# Patient Record
Sex: FEMALE | Race: BLACK OR AFRICAN AMERICAN | Employment: PART TIME
[De-identification: names, ages, dates, MRNs, and addresses within clinical notes are randomized per-mention and may not be internally consistent; named-entity substitution may affect disease eponyms.]

---

## 2017-02-08 ENCOUNTER — OFFICE VISIT (OUTPATIENT)
Dept: NEUROLOGY | Facility: CLINIC | Age: 57
End: 2017-02-08

## 2017-02-08 VITALS
BODY MASS INDEX: 34.78 KG/M2 | HEART RATE: 84 BPM | DIASTOLIC BLOOD PRESSURE: 80 MMHG | HEIGHT: 62 IN | SYSTOLIC BLOOD PRESSURE: 128 MMHG | WEIGHT: 189 LBS

## 2017-02-08 DIAGNOSIS — R51.9 HEADACHE DISORDER: Primary | ICD-10-CM

## 2017-02-08 PROCEDURE — 99214 OFFICE O/P EST MOD 30 MIN: CPT | Performed by: PSYCHIATRY & NEUROLOGY

## 2017-02-21 ENCOUNTER — OFFICE VISIT (OUTPATIENT)
Dept: FAMILY MEDICINE CLINIC | Facility: CLINIC | Age: 57
End: 2017-02-21

## 2017-02-21 VITALS
HEART RATE: 69 BPM | DIASTOLIC BLOOD PRESSURE: 59 MMHG | TEMPERATURE: 97.4 F | BODY MASS INDEX: 34.67 KG/M2 | SYSTOLIC BLOOD PRESSURE: 99 MMHG | HEIGHT: 62 IN | WEIGHT: 188.4 LBS | RESPIRATION RATE: 16 BRPM

## 2017-02-21 DIAGNOSIS — G43.909 MIGRAINE WITHOUT STATUS MIGRAINOSUS, NOT INTRACTABLE, UNSPECIFIED MIGRAINE TYPE: Primary | ICD-10-CM

## 2017-02-21 PROCEDURE — 99213 OFFICE O/P EST LOW 20 MIN: CPT | Performed by: INTERNAL MEDICINE

## 2017-02-21 ASSESSMENT — PATIENT HEALTH QUESTIONNAIRE - PHQ9
SUM OF ALL RESPONSES TO PHQ9 QUESTIONS 1 & 2: 0
1. LITTLE INTEREST OR PLEASURE IN DOING THINGS: 0
2. FEELING DOWN, DEPRESSED OR HOPELESS: 0
SUM OF ALL RESPONSES TO PHQ QUESTIONS 1-9: 0

## 2017-04-04 ENCOUNTER — OFFICE VISIT (OUTPATIENT)
Dept: NEUROLOGY | Age: 57
End: 2017-04-04
Payer: MEDICARE

## 2017-04-04 VITALS
HEIGHT: 63 IN | DIASTOLIC BLOOD PRESSURE: 82 MMHG | WEIGHT: 187 LBS | HEART RATE: 77 BPM | SYSTOLIC BLOOD PRESSURE: 117 MMHG | BODY MASS INDEX: 33.13 KG/M2

## 2017-04-04 DIAGNOSIS — G43.711 CHRONIC MIGRAINE WITHOUT AURA, WITH INTRACTABLE MIGRAINE, SO STATED, WITH STATUS MIGRAINOSUS: Primary | ICD-10-CM

## 2017-04-04 PROCEDURE — 99214 OFFICE O/P EST MOD 30 MIN: CPT | Performed by: PSYCHIATRY & NEUROLOGY

## 2017-04-04 RX ORDER — AMITRIPTYLINE HYDROCHLORIDE 25 MG/1
25 TABLET, FILM COATED ORAL NIGHTLY
Qty: 30 TABLET | Refills: 3 | Status: SHIPPED | OUTPATIENT
Start: 2017-04-04 | End: 2017-10-19 | Stop reason: SDUPTHER

## 2017-06-30 RX ORDER — TOPIRAMATE 25 MG/1
25 TABLET ORAL 2 TIMES DAILY
Qty: 60 TABLET | Refills: 1 | Status: SHIPPED | OUTPATIENT
Start: 2017-06-30 | End: 2017-10-19 | Stop reason: SDUPTHER

## 2017-08-17 ENCOUNTER — OFFICE VISIT (OUTPATIENT)
Dept: FAMILY MEDICINE CLINIC | Age: 57
End: 2017-08-17
Payer: MEDICARE

## 2017-08-17 VITALS
HEIGHT: 63 IN | TEMPERATURE: 98 F | RESPIRATION RATE: 16 BRPM | DIASTOLIC BLOOD PRESSURE: 78 MMHG | SYSTOLIC BLOOD PRESSURE: 122 MMHG | BODY MASS INDEX: 33.27 KG/M2 | WEIGHT: 187.74 LBS | HEART RATE: 84 BPM

## 2017-08-17 DIAGNOSIS — Z12.39 SCREENING BREAST EXAMINATION: ICD-10-CM

## 2017-08-17 DIAGNOSIS — M79.641 RIGHT HAND PAIN: Primary | ICD-10-CM

## 2017-08-17 PROCEDURE — 99213 OFFICE O/P EST LOW 20 MIN: CPT | Performed by: INTERNAL MEDICINE

## 2017-08-17 RX ORDER — IBUPROFEN 800 MG/1
TABLET ORAL
Refills: 0 | COMMUNITY
Start: 2017-08-09 | End: 2017-08-17

## 2017-08-17 RX ORDER — IBUPROFEN 800 MG/1
800 TABLET ORAL EVERY 8 HOURS PRN
Qty: 90 TABLET | Refills: 0 | Status: SHIPPED | OUTPATIENT
Start: 2017-08-17 | End: 2018-09-10 | Stop reason: ALTCHOICE

## 2017-08-17 RX ORDER — OXYCODONE HYDROCHLORIDE AND ACETAMINOPHEN 5; 325 MG/1; MG/1
TABLET ORAL
Refills: 0 | COMMUNITY
Start: 2017-08-13 | End: 2018-01-09 | Stop reason: ALTCHOICE

## 2017-08-17 RX ORDER — CYCLOBENZAPRINE HCL 10 MG
TABLET ORAL
Refills: 0 | COMMUNITY
Start: 2017-08-09 | End: 2018-01-09 | Stop reason: ALTCHOICE

## 2017-08-31 DIAGNOSIS — M79.641 RIGHT HAND PAIN: ICD-10-CM

## 2017-09-01 ENCOUNTER — TELEPHONE (OUTPATIENT)
Dept: FAMILY MEDICINE CLINIC | Age: 57
End: 2017-09-01

## 2017-10-19 ENCOUNTER — TELEPHONE (OUTPATIENT)
Dept: NEUROLOGY | Age: 57
End: 2017-10-19

## 2017-10-19 NOTE — TELEPHONE ENCOUNTER
I spoke with Rena Allen, she did not go to the ED.  I have put the prescriptions in for your approval.

## 2017-10-20 RX ORDER — AMITRIPTYLINE HYDROCHLORIDE 25 MG/1
25 TABLET, FILM COATED ORAL NIGHTLY
Qty: 30 TABLET | Refills: 0 | Status: SHIPPED | OUTPATIENT
Start: 2017-10-20 | End: 2017-11-21 | Stop reason: SDUPTHER

## 2017-10-20 RX ORDER — TOPIRAMATE 25 MG/1
TABLET ORAL
Qty: 60 TABLET | Refills: 0 | Status: SHIPPED | OUTPATIENT
Start: 2017-10-20 | End: 2017-11-21 | Stop reason: SDUPTHER

## 2017-11-21 RX ORDER — AMITRIPTYLINE HYDROCHLORIDE 25 MG/1
25 TABLET, FILM COATED ORAL NIGHTLY
Qty: 30 TABLET | Refills: 1 | Status: SHIPPED | OUTPATIENT
Start: 2017-11-21 | End: 2018-01-09 | Stop reason: SDUPTHER

## 2017-11-21 RX ORDER — TOPIRAMATE 25 MG/1
25 TABLET ORAL DAILY
Qty: 30 TABLET | Refills: 1 | Status: SHIPPED | OUTPATIENT
Start: 2017-11-21 | End: 2018-01-09 | Stop reason: SDUPTHER

## 2017-11-21 NOTE — TELEPHONE ENCOUNTER
Pt showed up late for her scheduled appt today. Douglas Castañeda was unable to see her. Her appt was rescheduled for 1/9/18. She asked about her refills. I looked and saw that in October Zaina had refill her Elavil and Topamax for 1 month only until her appt today because she had missed her last couple of appts. Pt stated she needed refills. This was discussed with Douglas Castañeda. She stated she would refill them for the pt until her next appt but she needs to show up for it on time. This was expressed to the pt. She stated her understanding.

## 2018-01-09 ENCOUNTER — OFFICE VISIT (OUTPATIENT)
Dept: NEUROLOGY | Age: 58
End: 2018-01-09
Payer: MEDICARE

## 2018-01-09 VITALS
HEIGHT: 63 IN | DIASTOLIC BLOOD PRESSURE: 75 MMHG | SYSTOLIC BLOOD PRESSURE: 108 MMHG | BODY MASS INDEX: 33.31 KG/M2 | HEART RATE: 86 BPM | WEIGHT: 188 LBS

## 2018-01-09 DIAGNOSIS — G43.909 MIGRAINE WITHOUT STATUS MIGRAINOSUS, NOT INTRACTABLE, UNSPECIFIED MIGRAINE TYPE: Primary | ICD-10-CM

## 2018-01-09 PROCEDURE — 99214 OFFICE O/P EST MOD 30 MIN: CPT | Performed by: NURSE PRACTITIONER

## 2018-01-09 PROCEDURE — 3017F COLORECTAL CA SCREEN DOC REV: CPT | Performed by: NURSE PRACTITIONER

## 2018-01-09 PROCEDURE — G8484 FLU IMMUNIZE NO ADMIN: HCPCS | Performed by: NURSE PRACTITIONER

## 2018-01-09 PROCEDURE — 3014F SCREEN MAMMO DOC REV: CPT | Performed by: NURSE PRACTITIONER

## 2018-01-09 PROCEDURE — 1036F TOBACCO NON-USER: CPT | Performed by: NURSE PRACTITIONER

## 2018-01-09 PROCEDURE — G8417 CALC BMI ABV UP PARAM F/U: HCPCS | Performed by: NURSE PRACTITIONER

## 2018-01-09 PROCEDURE — G8427 DOCREV CUR MEDS BY ELIG CLIN: HCPCS | Performed by: NURSE PRACTITIONER

## 2018-01-09 RX ORDER — AMITRIPTYLINE HYDROCHLORIDE 25 MG/1
25 TABLET, FILM COATED ORAL NIGHTLY
Qty: 30 TABLET | Refills: 5 | Status: SHIPPED | OUTPATIENT
Start: 2018-01-09 | End: 2018-09-10 | Stop reason: SDUPTHER

## 2018-01-09 RX ORDER — PREDNISONE 1 MG/1
5 TABLET ORAL DAILY
Refills: 0 | COMMUNITY
Start: 2018-01-03 | End: 2018-09-10 | Stop reason: ALTCHOICE

## 2018-01-09 RX ORDER — TOPIRAMATE 25 MG/1
25 TABLET ORAL DAILY
Qty: 30 TABLET | Refills: 5 | Status: SHIPPED | OUTPATIENT
Start: 2018-01-09 | End: 2018-09-10 | Stop reason: SDUPTHER

## 2018-01-09 NOTE — PROGRESS NOTES
arthritis) ([facility]          Past Surgical History:   Procedure Laterality Date     SECTION      CHOLECYSTECTOMY      TONSILLECTOMY         Family History   Problem Relation Age of Onset    High Blood Pressure Mother     Cancer Maternal Aunt      lung     Cancer Maternal Uncle      prostrate       Social History   Substance Use Topics    Smoking status: Never Smoker    Smokeless tobacco: Never Used    Alcohol use No                               REVIEW OF SYSTEMS    CONSTITUTIONAL Weight: absent, Appetite: absent, Fatigue: present      HEENT Ears: normal, Visual disturbance: absent   RESPIRATORY Shortness of breath: absent, Cough: absent   CARDIOVASCULAR Chest pain: absent, Leg swelling :absent      GI Constipation: absent, Diarrhea: absent, Swallowing change: absent       Urinary frequency: present, Urinary urgency: present, Urinary incontinence: absent   MUSCULOSKELETAL Neck pain: absent, Back pain: absent, Stiffness: absent, Muscle pain: absent, Joint pain: absent Restless legs: absent   DERMATOLOGIC Hair loss: absent, Skin changes: absent   NEUROLOGIC Memory loss: absent, Confusion: absent, Seizures: absent Trouble walking or imbalance: absent, Dizziness: absent, Weakness: absent, Numbness: absent Tremor: absent, Spasm: absent, Speech difficulty: absent, Headache: absent, Light sensitivity: absent   PSYCHIATRIC Anxiety: absent, Hallucination: absent, Mood disorder: absent   HEMATOLOGIC Abnormal bleeding: absent, Anemia: absent, Clotting disorder: absent, Lymph gland changes: absent           Allergies   Allergen Reactions    Sulfa Antibiotics Shortness Of Breath    Aspirin      Not able to take do to G6PD           Current Outpatient Prescriptions   Medication Sig Dispense Refill    predniSONE (DELTASONE) 5 MG tablet Take 5 mg by mouth daily  0    amitriptyline (ELAVIL) 25 MG tablet Take 1 tablet by mouth nightly 30 tablet 1    topiramate (TOPAMAX) 25 MG tablet Take 1 tablet by mouth daily

## 2018-09-10 ENCOUNTER — OFFICE VISIT (OUTPATIENT)
Dept: NEUROLOGY | Age: 58
End: 2018-09-10
Payer: MEDICARE

## 2018-09-10 VITALS
HEIGHT: 62 IN | DIASTOLIC BLOOD PRESSURE: 66 MMHG | BODY MASS INDEX: 34.45 KG/M2 | WEIGHT: 187.2 LBS | SYSTOLIC BLOOD PRESSURE: 121 MMHG | HEART RATE: 64 BPM

## 2018-09-10 DIAGNOSIS — G43.909 MIGRAINE WITHOUT STATUS MIGRAINOSUS, NOT INTRACTABLE, UNSPECIFIED MIGRAINE TYPE: Primary | ICD-10-CM

## 2018-09-10 PROCEDURE — 99214 OFFICE O/P EST MOD 30 MIN: CPT | Performed by: NURSE PRACTITIONER

## 2018-09-10 PROCEDURE — G8417 CALC BMI ABV UP PARAM F/U: HCPCS | Performed by: NURSE PRACTITIONER

## 2018-09-10 PROCEDURE — G8427 DOCREV CUR MEDS BY ELIG CLIN: HCPCS | Performed by: NURSE PRACTITIONER

## 2018-09-10 PROCEDURE — 3017F COLORECTAL CA SCREEN DOC REV: CPT | Performed by: NURSE PRACTITIONER

## 2018-09-10 PROCEDURE — 1036F TOBACCO NON-USER: CPT | Performed by: NURSE PRACTITIONER

## 2018-09-10 RX ORDER — AMITRIPTYLINE HYDROCHLORIDE 50 MG/1
50 TABLET, FILM COATED ORAL NIGHTLY
Qty: 30 TABLET | Refills: 5 | Status: SHIPPED | OUTPATIENT
Start: 2018-09-10

## 2018-09-10 RX ORDER — TOPIRAMATE 25 MG/1
25 TABLET ORAL DAILY
Qty: 30 TABLET | Refills: 5 | Status: SHIPPED | OUTPATIENT
Start: 2018-09-10

## 2018-09-10 NOTE — PROGRESS NOTES
her right eye as well, but denies any blurred or double vision. She denies weakness numbness or tingling in her extremities, gait or balance difficulties.                   Past Medical History:   Diagnosis Date    Anemia     Fibromyalgia     Headache     Hypothyroidism     Osteoarthritis     RA (rheumatoid arthritis) (HCC)     UTI (urinary tract infection)          Past Surgical History:   Procedure Laterality Date     SECTION      CHOLECYSTECTOMY      TONSILLECTOMY         Family History   Problem Relation Age of Onset    High Blood Pressure Mother     Cancer Maternal Aunt         lung     Cancer Maternal Uncle         prostrate       Social History   Substance Use Topics    Smoking status: Never Smoker    Smokeless tobacco: Never Used    Alcohol use No                               REVIEW OF SYSTEMS    CONSTITUTIONAL Weight: absent, Appetite: absent, Fatigue: absent      HEENT Ears: normal, Visual disturbance: absent   RESPIRATORY Shortness of breath: absent, Cough: absent   CARDIOVASCULAR Chest pain: absent, Leg swelling :absent      GI Constipation: absent, Diarrhea: absent, Swallowing change: absent       Urinary frequency: absent, Urinary urgency: absent, Urinary incontinence: absent   MUSCULOSKELETAL Neck pain: absent, Back pain: absent, Stiffness: absent, Muscle pain: absent, Joint pain: absent Restless legs: absent   DERMATOLOGIC Hair loss: absent, Skin changes: absent   NEUROLOGIC Memory loss: absent, Confusion: absent, Seizures: absent Trouble walking or imbalance: absent, Dizziness: absent, Weakness: absent, Numbness: absent Tremor: absent, Spasm: absent, Speech difficulty: absent, Headache: absent, Light sensitivity: absent   PSYCHIATRIC Anxiety: absent, Hallucination: absent, Mood disorder: absent   HEMATOLOGIC Abnormal bleeding: absent, Anemia: absent, Clotting disorder: absent, Lymph gland changes: absent           Allergies   Allergen Reactions    Sulfa Antibiotics expression full, symmetric. CN VIII   CN VIII normal.     CN IX, X   CN IX normal.     CN XI   CN XI normal.     CN XII   CN XII normal.     Motor Exam   Muscle bulk: normal  Overall muscle tone: normal  Right arm tone: normal  Left arm tone: normal  Right arm pronator drift: absent  Left arm pronator drift: absent  Right leg tone: normal  Left leg tone: normal    Strength   Strength 5/5 throughout. Sensory Exam   Light touch normal.   Vibration normal.   Pinprick normal.     Gait, Coordination, and Reflexes     Gait  Gait: normal    Coordination   Finger to nose coordination: normal    Tremor   Resting tremor: absent    Reflexes   Right brachioradialis: 2+  Left brachioradialis: 2+  Right biceps: 2+  Left biceps: 2+  Right triceps: 2+  Left triceps: 2+  Right patellar: 2+  Left patellar: 2+  Right achilles: 2+  Left achilles: 2+  Right plantar: normal  Left plantar: normal            ASSESSMENT/PLAN:       In summary, your patient, Marlena Albarran exhibits the following, with associated plan:    1. Chronic migraine headaches  1. Increase amitriptyline to 50 mg at bedtime daily  2. Continue Topamax 25 mg daily  2. Possible temporal arteritis with chronically elevated sed rates  1. Obtain records from Dr. Carlos Costello from rheumatology. Patient was advised to follow-up with him.             Signed: Claudene Penna, CNP      *Please note that portions of this note were completed with a voice recognition program.  Although every effort was made to insure the accuracy of this automated transcription, some errors in transcription may have occurred, occasionally words and are mis-transcribed

## 2021-03-31 ENCOUNTER — HOSPITAL ENCOUNTER (OUTPATIENT)
Age: 61
Setting detail: SPECIMEN
Discharge: HOME | End: 2021-03-31
Payer: MEDICAID

## 2021-03-31 ENCOUNTER — OFFICE VISIT (OUTPATIENT)
Dept: OBGYN CLINIC | Age: 61
End: 2021-03-31
Payer: MEDICAID

## 2021-03-31 DIAGNOSIS — N95.0 POSTMENOPAUSAL BLEEDING: ICD-10-CM

## 2021-03-31 DIAGNOSIS — Z01.419 WOMEN'S ANNUAL ROUTINE GYNECOLOGICAL EXAMINATION: Primary | ICD-10-CM

## 2021-03-31 DIAGNOSIS — N84.2 VAGINAL POLYP: ICD-10-CM

## 2021-03-31 DIAGNOSIS — R10.30 LOWER ABDOMINAL PAIN: ICD-10-CM

## 2021-03-31 DIAGNOSIS — Z12.4 CERVICAL CANCER SCREENING: ICD-10-CM

## 2021-03-31 DIAGNOSIS — Z20.2 POSSIBLE EXPOSURE TO STD: ICD-10-CM

## 2021-03-31 PROCEDURE — G8484 FLU IMMUNIZE NO ADMIN: HCPCS | Performed by: ADVANCED PRACTICE MIDWIFE

## 2021-03-31 PROCEDURE — 99386 PREV VISIT NEW AGE 40-64: CPT | Performed by: ADVANCED PRACTICE MIDWIFE

## 2021-03-31 SDOH — SOCIAL STABILITY: SOCIAL NETWORK: ARE YOU MARRIED, WIDOWED, DIVORCED, SEPARATED, NEVER MARRIED, OR LIVING WITH A PARTNER?: NOT ASKED

## 2021-03-31 SDOH — ECONOMIC STABILITY: FOOD INSECURITY: WITHIN THE PAST 12 MONTHS, THE FOOD YOU BOUGHT JUST DIDN'T LAST AND YOU DIDN'T HAVE MONEY TO GET MORE.: NEVER TRUE

## 2021-03-31 SDOH — ECONOMIC STABILITY: TRANSPORTATION INSECURITY
IN THE PAST 12 MONTHS, HAS THE LACK OF TRANSPORTATION KEPT YOU FROM MEDICAL APPOINTMENTS OR FROM GETTING MEDICATIONS?: NO

## 2021-03-31 SDOH — SOCIAL STABILITY: SOCIAL NETWORK: HOW OFTEN DO YOU GET TOGETHER WITH FRIENDS OR RELATIVES?: NOT ASKED

## 2021-03-31 SDOH — SOCIAL STABILITY: SOCIAL NETWORK
DO YOU BELONG TO ANY CLUBS OR ORGANIZATIONS SUCH AS CHURCH GROUPS UNIONS, FRATERNAL OR ATHLETIC GROUPS, OR SCHOOL GROUPS?: NOT ASKED

## 2021-03-31 SDOH — SOCIAL STABILITY: SOCIAL INSECURITY: WITHIN THE LAST YEAR, HAVE YOU BEEN AFRAID OF YOUR PARTNER OR EX-PARTNER?: NO

## 2021-03-31 SDOH — SOCIAL STABILITY: SOCIAL INSECURITY: WITHIN THE LAST YEAR, HAVE YOU BEEN HUMILIATED OR EMOTIONALLY ABUSED IN OTHER WAYS BY YOUR PARTNER OR EX-PARTNER?: NO

## 2021-03-31 SDOH — ECONOMIC STABILITY: FOOD INSECURITY: WITHIN THE PAST 12 MONTHS, YOU WORRIED THAT YOUR FOOD WOULD RUN OUT BEFORE YOU GOT MONEY TO BUY MORE.: NEVER TRUE

## 2021-03-31 ASSESSMENT — PATIENT HEALTH QUESTIONNAIRE - PHQ9
SUM OF ALL RESPONSES TO PHQ QUESTIONS 1-9: 0
2. FEELING DOWN, DEPRESSED OR HOPELESS: 0
SUM OF ALL RESPONSES TO PHQ9 QUESTIONS 1 & 2: 0
SUM OF ALL RESPONSES TO PHQ QUESTIONS 1-9: 0
1. LITTLE INTEREST OR PLEASURE IN DOING THINGS: 0

## 2021-03-31 NOTE — PROGRESS NOTES
notes they plan to hold off on endoscopic procedure but are considering repeat flexible sigmoidoscopy vs repeat colonoscopy (would have been due 2024). She reports bloating almost every day. Dr. Ankita Isaacs ordered a CT scan of the abdomen and pelvis for further evaluation which looks to have been done 3/22/21 but no results are available, Jesus Howard reports they scheduled an MRI that was just approved by her insurance. She has a follow up appointment with her PCP on 4/22/21. She completes routine mammograms via [facility] and desires to continue that method so she declines an order today. Review of Systems   Constitutional: Negative. HENT: Negative. Eyes: Negative. Respiratory: Negative. Cardiovascular: Negative. Gastrointestinal: Positive for abdominal pain and constipation (chronic). Negative for anal bleeding, diarrhea, nausea, rectal pain and vomiting. Bloating   Endocrine: Negative. Genitourinary: Positive for pelvic pain and vaginal bleeding (postmenopausal ). Negative for dyspareunia, dysuria, flank pain, frequency, genital sores and vaginal discharge. Musculoskeletal: Negative. Skin: Negative. Allergic/Immunologic: Negative. Neurological: Negative. Hematological: Negative. Psychiatric/Behavioral: Negative.       Preventive Health Screening:   Date of last pap: 2020 with [address], normal per pt report, ROR completed  History of abnormal pap: denies          HPV typing/date: collected today  Date of last mammogram: BI-RADS 1 2020 per pt via [facility]  Date of last DEXA scan: normal 2016  Date of last colonoscopy:  Normal 2012    History of Gestational Diabetes: No     If Yes, Glucose screening ordered: N/A    Preventive screening: Yes, with PCP    Family history of Breast, Ovarian, Colon or Uterine Cancer:  Negative screening     If Yes see scanned worksheet    PHQ Scores 3/31/2021 2/21/2017 11/17/2016 8/11/2016 7/7/2016   PHQ2 Score 0 0 0 0 0   PHQ9 Score 0 0 0 0 0 Interpretation of Total Score Depression Severity: 1-4 = Minimal depression, 5-9 = Mild depression, 10-14 = Moderate depression, 15-19 = Moderately severe depression, 20-27 = Severe depression  No flowsheet data found. Interpretation of MIKAYLA-7 score: 5-9 = mild anxiety, 10-14 = moderate anxiety, 15+ = severe anxiety. Recommend referral to behavioral health for scores 10 or greater. Social Determinants of Health:   Social History     Tobacco Use   Smoking Status Never Smoker   Smokeless Tobacco Never Used      Social History     Substance and Sexual Activity   Alcohol Use No      Social History     Substance and Sexual Activity   Drug Use No      Social Needs    Financial resource strain: Not hard at all      Social Needs   Food insecurity    Worry: Never true    Inability: Never true      Social Needs   Transportation needs    Medical: No    Non-medical: No      Relationships   Social connections    Talks on phone: Not on file    Gets together: Not on file    Attends Anabaptism service: Not on file    Active member of club or organization: Not on file    Attends meetings of clubs or organizations: Not on file    Relationship status: Not on file      Lifestyle    Stress: Not on file      Relationships   Intimate partner violence    Fear of current or ex partner: No    Emotionally abused: No    Physically abused: No    Forced sexual activity: No     Lifestyle   Physical activity    Days per week: Not on file    Minutes per session: Not on file         Objective  Blood pressure 132/84, pulse 102, height 5' 2.4\" (1.585 m), weight 198 lb (89.8 kg), not currently breastfeeding. No LMP recorded. Patient is postmenopausal. Body mass index is 35.75 kg/m².    Current Outpatient Medications on File Prior to Visit   Medication Sig Dispense Refill    amitriptyline (ELAVIL) 50 MG tablet Take 1 tablet by mouth nightly (Patient not taking: Reported on 3/31/2021) 30 tablet 5    topiramate (TOPAMAX) 25 MG Left: No tenderness. Comments: No blood noted in vaginal vault. Bimanual exhibits pressure for patient but no pain. Difficult to palpate adnexae d/t body habitus   Lymphadenopathy:      Cervical: No cervical adenopathy. Upper Body:      Right upper body: No supraclavicular or axillary adenopathy. Left upper body: No supraclavicular or axillary adenopathy. Lower Body: No right inguinal adenopathy. No left inguinal adenopathy. Skin:     General: Skin is warm and dry. Capillary Refill: Capillary refill takes less than 2 seconds. Neurological:      Mental Status: She is alert and oriented to person, place, and time. Mental status is at baseline. Psychiatric:         Mood and Affect: Mood normal.         Behavior: Behavior normal.         Thought Content: Thought content normal.         Judgment: Judgment normal.       Chaperone for Intimate Exam   Chaperone: Pollo HOWELL   Assessment/Plan:  Women's annual routine gynecological examination  General Health:  [x] Alcohol screening & counseling-negative  [x] Blood pressure screening: normal  [] Contraceptive counseling & methods: N/A  [x] Depression Screening: Negative  [x] Diabetes Screening: care per PCP  [] Folic acid supplementation  [] Healthful diet & activity counseling:  Not done  [x] Interpersonal violence screening: Negative  [] Lipid screening: care per PCP  [x] Obesity Screening: Body mass index is 35.75 kg/m².   [x] Osteoporosis screening due 75 yo  [x] Substance use screening & counseling- negative   [x] Tobacco screening & counseling- negative  [x] Urinary incontinence screening- not done    Infectious Diseases:  [x] Gonorrhea & chlamydia screening: ordered  [] Hepatitis C Screening not done  [] HIV risk assessment/ testing (at least once in lifetime): not done   [] Immunizations: not discussed  [x] STI prevention counseling: done    Cancer:  [x] Cervical cancer screening: done - will review records from [facility] when obtained  [x] Mammograms (started at 41yrs old): Declines order, plans to complete via [facility]   [x] BRCA testing risk assessment: not candidate  [x] Colon cancer screening: seeing GI, next routine due 2024  [] Skin Cancer Screening: not done     Cervical cancer screening  · PAP SMEAR; Future  · Do not have records from [facility] for review at this time, ROR filled out and will follow up    Postmenopausal bleeding, vaginal polyp, history of uterine fibroid   · US PELVIS COMPLETE; Future  · US NON OB TRANSVAGINAL; Future  · See Subjective. Postmenopausal x 5 years. No history of HRT. One other episode of postmenopausal bleeding in 2017 and ultrasound showed 8 cm fibroid in the endometrium, they encouraged her to follow up with further vaginal bleeding per notes. Did not want to pursue fibroid removal at that time. · No bleeding noted on exam today, did note two polyps adjacent to the cervix. They are flesh colored and not friable, not sure if this is the cause of her bleeding. Reviewed most polyps are benign. Uterine palpates enlarged, suspect fibroid persists. Pressure on exam but no pain. · Plan for repeat ultrasound and will discuss with collaborating physician following results to discuss plan of care. Lower abdominal pain  · Is being worked up by GI and her PCP (See Subjective) for abdominal pain particularly after defecation and she has abdominal bloating. Per pt she has an upcoming MRI of her abdomen and pelvis, will follow up with her to see if there are pertinent findings. Reports insurance has approved it she just needs scheduled. Possible exposure to STD  · Chlamydia/GC DNA, Thin Prep; Future  · VAGINITIS DNA PROBE; Future  · Reviewed importance of condom usage with every sexual encounter         Return for will follow up after ultrasound results to plan next office visit . Problem list reviewed and updated as indicated.    Upon completion of the visit all questions were answered. History was reviewed as documented on Epic Navigator. The patient, João Lima,  was seen with a total time spent of 35 minutes for the visit on this date of service by the [facility]  The time component involved both face-to-face (counseling and education) and non face-to-face time (care coordination), spent in determining the total time component.       Electronically Signed by: ROBERTO Nation CNM

## 2021-04-01 DIAGNOSIS — Z20.2 POSSIBLE EXPOSURE TO STD: ICD-10-CM

## 2021-04-01 LAB
DIRECT EXAM: NORMAL
Lab: NORMAL
SPECIMEN DESCRIPTION: NORMAL

## 2021-04-02 VITALS
HEART RATE: 102 BPM | WEIGHT: 198 LBS | BODY MASS INDEX: 36.44 KG/M2 | DIASTOLIC BLOOD PRESSURE: 84 MMHG | HEIGHT: 62 IN | SYSTOLIC BLOOD PRESSURE: 132 MMHG

## 2021-04-02 PROBLEM — D75.A G6PD DEFICIENCY: Status: ACTIVE | Noted: 2021-04-02

## 2021-04-02 LAB
CHLAMYDIA BY THIN PREP: NEGATIVE
HPV SAMPLE: NORMAL
HPV, GENOTYPE 16: NOT DETECTED
HPV, GENOTYPE 18: NOT DETECTED
HPV, HIGH RISK OTHER: NOT DETECTED
HPV, INTERPRETATION: NORMAL
N. GONORRHOEAE DNA, THIN PREP: NEGATIVE
SPECIMEN DESCRIPTION: NORMAL
SPECIMEN DESCRIPTION: NORMAL

## 2021-04-02 ASSESSMENT — ENCOUNTER SYMPTOMS
ALLERGIC/IMMUNOLOGIC NEGATIVE: 1
CONSTIPATION: 1
DIARRHEA: 0
VOMITING: 0
ANAL BLEEDING: 0
NAUSEA: 0
EYES NEGATIVE: 1
RECTAL PAIN: 0
RESPIRATORY NEGATIVE: 1
ABDOMINAL PAIN: 1

## 2021-04-06 ENCOUNTER — HOSPITAL ENCOUNTER (OUTPATIENT)
Dept: ULTRASOUND IMAGING | Facility: CLINIC | Age: 61
Discharge: HOME | End: 2021-04-08
Payer: MEDICAID

## 2021-04-06 DIAGNOSIS — N95.0 POSTMENOPAUSAL BLEEDING: ICD-10-CM

## 2021-04-06 LAB — CYTOLOGY REPORT: NORMAL

## 2021-04-06 PROCEDURE — 76856 US EXAM PELVIC COMPLETE: CPT

## 2021-04-07 DIAGNOSIS — N95.0 POSTMENOPAUSAL BLEEDING: Primary | ICD-10-CM

## 2021-04-08 ENCOUNTER — HOSPITAL ENCOUNTER (OUTPATIENT)
Dept: ULTRASOUND IMAGING | Facility: CLINIC | Age: 61
Discharge: HOME | End: 2021-04-10
Payer: MEDICAID

## 2021-04-08 DIAGNOSIS — N95.0 POSTMENOPAUSAL BLEEDING: ICD-10-CM

## 2021-04-08 PROCEDURE — 76830 TRANSVAGINAL US NON-OB: CPT

## 2021-04-09 ENCOUNTER — TELEPHONE (OUTPATIENT)
Dept: OBGYN CLINIC | Age: 61
End: 2021-04-09

## 2021-04-09 NOTE — TELEPHONE ENCOUNTER
Called Steffany to review ultrasound findings. She reports since she was seen in the office 3/31/21 she has had \"a little\" vaginal bleeding. Reports it is dark brown and scant in amount, noted after she wipes and she sees \"tiny clots\" in the toilet after she goes to the bathroom. She has been wearing a liner but does not notice any vaginal bleeding on the liner. Continues to have pain before defecation for which she has been seeing GI, next steps was for an MRI per the pt. Reviewed the ultrasound shows a normal endometrial stripe (3mm), normal size uterus (8.1 x 5.3 x 5.1 cm) with heterogenous echogenicity. She does have a fibroid to the right uterine body/fundal that is 3.6 x 3.3 x 3.7 cm. Was noted to be 8 cm on ultrasound in 2017 per previous provider notes. Reviewed with Uday Gannon that I will review her case with my collaborating physicians (Dr. Alfreda Rodriguez and Dr. Lula Ramos) on Monday and call her back with next steps.

## 2021-04-12 NOTE — TELEPHONE ENCOUNTER
Reviewed patient case with Dr. Rikki Ashton, called Jorden Wick to discuss plan of care. VM left requesting pt to call office and ask for writer.

## 2021-04-14 PROBLEM — D25.9 UTERINE FIBROID: Status: ACTIVE | Noted: 2021-04-14

## 2021-04-14 PROBLEM — N84.2 VAGINAL POLYP: Status: ACTIVE | Noted: 2021-04-14

## 2021-04-14 NOTE — TELEPHONE ENCOUNTER
Reviewed with Ras Del Cid that I spoke with Dr. Diana Whalen about her case and no further testing needs done at this time. Her endometrial lining is thin (see U/S details below). Reviewed may be related to fibroid vs some \"atrophy\" vs the polyps found on exam. She will follow up if vaginal bleeding persists or she has any concerns. Abdominal pain/bloating will continue to be worked up per GI.    Verbalizes understanding

## 2022-08-17 NOTE — PROGRESS NOTES
[facility]  [place] [address]    [address]  [address] [facility] [address]  Dept: [phone]   Patient Name: Richelle Kayser  Patient : 1960  MRN #: [id number]  [facility] #: [id number]    Date: 2022  Time: 10:01 AM    Subjective:  HPI: Richelle Kayser is a [id number] who's No LMP recorded. Patient is postmenopausal.. She is here today complaining of vaginal pain. Her symptoms started 1 month ago. She describes it as \"hurts\". She states the color/ consistency/ odor is denies. Aggravating/ alieving factors nothing. She denies new sexual partner, change in soap/ laundry detergent or change in medications. Her last intercourse was not in over a year.     Past Medical History:   Diagnosis Date    Anemia     Fibromyalgia     Headache     Hypothyroidism     Osteoarthritis     RA (rheumatoid arthritis) ([facility])     UTI (urinary tract infection)       Past Surgical History:   Procedure Laterality Date     SECTION      CHOLECYSTECTOMY      TONSILLECTOMY        Social History     Socioeconomic History    Marital status: Single     Spouse name: Not on file    Number of children: Not on file    Years of education: Not on file    Highest education level: Not on file   Occupational History    Not on file   Tobacco Use    Smoking status: Never    Smokeless tobacco: Never   Vaping Use    Vaping Use: Never used   Substance and Sexual Activity    Alcohol use: No    Drug use: No    Sexual activity: Yes   Other Topics Concern    Not on file   Social History Narrative    Not on file     Social Determinants of Health     Financial Resource Strain: Not on file   Food Insecurity: Not on file   Transportation Needs: Not on file   Physical Activity: Not on file   Stress: Not on file   Social Connections: Not on file   Intimate Partner Violence: Not on file   Housing Stability: Not on file      Allergies   Allergen Reactions    Sulfa Antibiotics Shortness Of Breath    Aspirin      Not able to take do to G6PD          Current Outpatient Medications   Medication Sig Dispense Refill    naproxen (NAPROSYN) 500 MG tablet take 1 tablet by mouth every 12 hours for 5 days then take 1 tablet every 12 hours if needed      Multiple Vitamin (MULTIVITAMIN ADULT PO) Take by mouth      Cranberry 1000 MG CAPS Take by mouth      vitamin D3 (CHOLECALCIFEROL) 10 MCG (400 UNIT) TABS tablet Take 400 Units by mouth daily      estradiol (ESTRACE VAGINAL) 0.1 MG/GM vaginal cream Place 1 g vaginally See Admin Instructions 1gm daily for 2 weeks then 3x per week 1 each 3    amitriptyline (ELAVIL) 50 MG tablet Take 1 tablet by mouth nightly 30 tablet 5    topiramate (TOPAMAX) 25 MG tablet Take 1 tablet by mouth daily (Patient not taking: No sig reported) 30 tablet 5     No current facility-administered medications for this visit. Review of Systems: all 10 other organ systems reviewed and are negative except those noted in HPI    O: Physical Examination  Vital Signs:    /76 (Site: Left Upper Arm, Position: Sitting, Cuff Size: Large Adult)   Pulse 61   Ht 5' 2\" (1.575 m)   Wt 195 lb (88.5 kg)   BMI 35.67 kg/m²    Body mass index is 35.67 kg/m². General: appears alert, oriented and cooperative. Skin: Normal in appearance, texture, and temperature; No lesions    Genitourinary:    External genitalia: atrophic labia minora, clitoris, urethral orifice and introitus WNL except for tenderness at gaping introitis  Vagina: pale pink vaginal walls and mucosa with  rugae, no masses, tenderness or discharge  Cervix: pink, no masses, CMT, or discharge  Uterus: RV midline, smooth, normal size, non-tender  Adnexa: ovaries not palpable, no masses or tenderness;  Bi-manual exam WNL  Perineum: intact, WNL  Rectum: Normal rectal sphincter tone; no rectal masses, fissures or hemorrhoids      A/P:    Visit Diagnoses         Codes    Vaginal atrophy    -  Primary N95.2 Orders Placed This Encounter   Medications    DISCONTD: estradiol (ESTRACE VAGINAL) 0.1 MG/GM vaginal cream     Sig: Place 1 g vaginally daily     Dispense:  1 each     Refill:  3    estradiol (ESTRACE VAGINAL) 0.1 MG/GM vaginal cream     Sig: Place 1 g vaginally See Admin Instructions 1gm daily for 2 weeks then 3x per week     Dispense:  1 each     Refill:  3      Discussed will take several weeks to see improvement   Today's visit was 25 minutes of face to face. Follow up in 1 week if not improved or PRN.

## 2022-08-18 ENCOUNTER — OFFICE VISIT (OUTPATIENT)
Dept: OBGYN CLINIC | Age: 62
End: 2022-08-18
Payer: MEDICAID

## 2022-08-18 VITALS
HEART RATE: 61 BPM | SYSTOLIC BLOOD PRESSURE: 124 MMHG | HEIGHT: 62 IN | BODY MASS INDEX: 35.88 KG/M2 | WEIGHT: 195 LBS | DIASTOLIC BLOOD PRESSURE: 76 MMHG

## 2022-08-18 DIAGNOSIS — N95.2 VAGINAL ATROPHY: Primary | ICD-10-CM

## 2022-08-18 PROCEDURE — 3017F COLORECTAL CA SCREEN DOC REV: CPT | Performed by: ADVANCED PRACTICE MIDWIFE

## 2022-08-18 PROCEDURE — 1036F TOBACCO NON-USER: CPT | Performed by: ADVANCED PRACTICE MIDWIFE

## 2022-08-18 PROCEDURE — G8417 CALC BMI ABV UP PARAM F/U: HCPCS | Performed by: ADVANCED PRACTICE MIDWIFE

## 2022-08-18 PROCEDURE — 99213 OFFICE O/P EST LOW 20 MIN: CPT | Performed by: ADVANCED PRACTICE MIDWIFE

## 2022-08-18 PROCEDURE — G8428 CUR MEDS NOT DOCUMENT: HCPCS | Performed by: ADVANCED PRACTICE MIDWIFE

## 2022-08-18 RX ORDER — ESTRADIOL 0.1 MG/G
1 CREAM VAGINAL SEE ADMIN INSTRUCTIONS
Qty: 1 EACH | Refills: 3 | Status: SHIPPED | OUTPATIENT
Start: 2022-08-18 | End: 2022-09-13

## 2022-08-18 RX ORDER — NAPROXEN 500 MG/1
TABLET ORAL
COMMUNITY
Start: 2022-08-10

## 2022-08-18 RX ORDER — ESTRADIOL 0.1 MG/G
1 CREAM VAGINAL DAILY
Qty: 1 EACH | Refills: 3 | Status: SHIPPED | OUTPATIENT
Start: 2022-08-18 | End: 2022-08-18

## 2022-08-18 RX ORDER — OMEGA-3S/DHA/EPA/FISH OIL/D3 300MG-1000
400 CAPSULE ORAL DAILY
COMMUNITY

## 2022-09-13 ENCOUNTER — OFFICE VISIT (OUTPATIENT)
Dept: OBGYN CLINIC | Age: 62
End: 2022-09-13
Payer: MEDICAID

## 2022-09-13 VITALS
HEIGHT: 62 IN | BODY MASS INDEX: 35.15 KG/M2 | HEART RATE: 71 BPM | DIASTOLIC BLOOD PRESSURE: 82 MMHG | SYSTOLIC BLOOD PRESSURE: 134 MMHG | WEIGHT: 191 LBS

## 2022-09-13 DIAGNOSIS — N95.1 MENOPAUSAL SYMPTOMS: Primary | ICD-10-CM

## 2022-09-13 PROCEDURE — G8427 DOCREV CUR MEDS BY ELIG CLIN: HCPCS | Performed by: ADVANCED PRACTICE MIDWIFE

## 2022-09-13 PROCEDURE — 1036F TOBACCO NON-USER: CPT | Performed by: ADVANCED PRACTICE MIDWIFE

## 2022-09-13 PROCEDURE — 3017F COLORECTAL CA SCREEN DOC REV: CPT | Performed by: ADVANCED PRACTICE MIDWIFE

## 2022-09-13 PROCEDURE — G8417 CALC BMI ABV UP PARAM F/U: HCPCS | Performed by: ADVANCED PRACTICE MIDWIFE

## 2022-09-13 PROCEDURE — 99213 OFFICE O/P EST LOW 20 MIN: CPT | Performed by: ADVANCED PRACTICE MIDWIFE

## 2022-09-13 RX ORDER — ESTRADIOL 0.1 MG/G
1 CREAM VAGINAL SEE ADMIN INSTRUCTIONS
Qty: 1 EACH | Refills: 3 | Status: SHIPPED | OUTPATIENT
Start: 2022-09-13

## 2022-09-13 NOTE — PROGRESS NOTES
Pt is here today for a follow up vaginal pain. Patient states that she is doing better and has no other issues.

## 2022-09-13 NOTE — PROGRESS NOTES
[facility] AND GYNECOLOGY  [facility] [address]  [address] [facility] [address]  Dept: [phone]     Patient Name: Breanna Santamaria  Patient : 1960  MRN #: [id number]  [facility] #: [id number]    Date: 2022  Time: 9:34 AM    Subjective:  HPI: Breanna Santamaria is a [id number] who's postmenopausal. She is here today to follow up on starting estradiol cream for vaginal atrophy w/ pain. She reports the discomfort had improved. She does notice an increase in lubrication. She reports the darkening skin color on her labia has been unchanged. She has been inserting the cream vaginally. She is happy with the results of this medication.          Past Medical History:   Diagnosis Date    Anemia     Fibroid, uterine     Fibromyalgia     Headache     History of postmenopausal bleeding     episode in ,     Hypothyroidism     Osteoarthritis     RA (rheumatoid arthritis) ([facility])     UTI (urinary tract infection)       Past Surgical History:   Procedure Laterality Date     SECTION      CHOLECYSTECTOMY      TONSILLECTOMY        Social History     Socioeconomic History    Marital status: Single     Spouse name: Not on file    Number of children: Not on file    Years of education: Not on file    Highest education level: Not on file   Occupational History    Not on file   Tobacco Use    Smoking status: Never    Smokeless tobacco: Never   Vaping Use    Vaping Use: Never used   Substance and Sexual Activity    Alcohol use: No    Drug use: No    Sexual activity: Not Currently   Other Topics Concern    Not on file   Social History Narrative    Not on file     Social Determinants of Health     Financial Resource Strain: Not on file   Food Insecurity: Not on file   Transportation Needs: Not on file   Physical Activity: Not on file   Stress: Not on file   Social Connections: Not on file   Intimate Partner Violence: Not on file   Housing Stability: Not on file      Allergies   Allergen Reactions    Sulfa Antibiotics Shortness Of Breath    Aspirin      Not able to take do to G6PD          Current Outpatient Medications   Medication Sig Dispense Refill    naproxen (NAPROSYN) 500 MG tablet take 1 tablet by mouth every 12 hours for 5 days then take 1 tablet every 12 hours if needed      Multiple Vitamin (MULTIVITAMIN ADULT PO) Take by mouth      Cranberry 1000 MG CAPS Take by mouth      vitamin D3 (CHOLECALCIFEROL) 10 MCG (400 UNIT) TABS tablet Take 400 Units by mouth daily      estradiol (ESTRACE VAGINAL) 0.1 MG/GM vaginal cream Place 1 g vaginally See Admin Instructions 1gm daily for 2 weeks then 3x per week 1 each 3    amitriptyline (ELAVIL) 50 MG tablet Take 1 tablet by mouth nightly 30 tablet 5    topiramate (TOPAMAX) 25 MG tablet Take 1 tablet by mouth daily (Patient not taking: Reported on 9/13/2022) 30 tablet 5     No current facility-administered medications for this visit. Review of Systems: all 10 other organ systems reviewed and are negative except those noted in HPI    O: Physical Examination  Vital Signs:    /82 (Site: Left Upper Arm, Position: Sitting, Cuff Size: Large Adult)   Pulse 71   Ht 5' 2\" (1.575 m)   Wt 191 lb (86.6 kg)   BMI 34.93 kg/m²    Body mass index is 34.93 kg/m². General: appears alert, oriented and cooperative. Skin: Normal in appearance, texture, and temperature; No lesions    Genitourinary:    External genitalia: atrophic labia, clitoris, urethral orifice and introitus   Vagina: pale pink vaginal walls, normal mucosa with normal rugae and lubrication, no masses, tenderness or discharge- pt has increased tolerance to penetration for speculum exam.   Cervix: pink, no masses, CMT, or discharge  Uterus: AV, midline, smooth, normal size, non-tender  Adnexa: ovaries not palpable, no masses or tenderness;  Bi-manual exam WNL  Perineum: intact, WNL  Rectum: Normal rectal sphincter tone; no rectal masses, fissures or hemorrhoids    A/P:  Visit Diagnoses         Codes    Menopausal symptoms    -  Primary N95.1          Orders Placed This Encounter   Medications    estradiol (ESTRACE VAGINAL) 0.1 MG/GM vaginal cream     Sig: Place 1 g vaginally See Admin Instructions 1gm 2x per week     Dispense:  1 each     Refill:  3       Pt instructed to call office if she develops any vaginal bleeding or return of discomfort. Pt was educated on requiring treatment for the next few years. Discussed add back therapy    Today's visit was 25 minutes of face to face.    Return in about 1 year (around 9/13/2023) for annual.

## 2022-10-17 ENCOUNTER — PROCEDURE VISIT (OUTPATIENT)
Dept: OBGYN CLINIC | Age: 62
End: 2022-10-17
Payer: MEDICAID

## 2022-10-17 ENCOUNTER — TELEPHONE (OUTPATIENT)
Dept: OBGYN CLINIC | Age: 62
End: 2022-10-17

## 2022-10-17 DIAGNOSIS — N95.0 POSTMENOPAUSAL BLEEDING: ICD-10-CM

## 2022-10-17 DIAGNOSIS — N95.0 POSTMENOPAUSAL BLEEDING: Primary | ICD-10-CM

## 2022-10-17 PROCEDURE — 76856 US EXAM PELVIC COMPLETE: CPT | Performed by: OBSTETRICS & GYNECOLOGY

## 2022-10-17 PROCEDURE — 76830 TRANSVAGINAL US NON-OB: CPT | Performed by: OBSTETRICS & GYNECOLOGY

## 2022-10-17 NOTE — PROGRESS NOTES
PELVIC AND TRANSVAGINAL ULTRASOUND (NON-OB)    UTERUS: 8.72 x 5.84 x 5.40 cm  Anteverted, heterogeneous    ENDO: 1.18 cm  Thickened, difficult to visualize due to fibroid    RT. OVARY: 1.61 x  1.32 x 1.03 cm  unremarkable    LT. OVARY: Not visualized    No pelvic free fluid seen    1 submucosal fibroid seen mid [place] measuring: 3.97 x 3.36 x 4.04 cm  1 intramural fibroid seen rt [place] measuring: 2.59 x 3.06 x 2.47 cm

## 2022-10-17 NOTE — TELEPHONE ENCOUNTER
Pt called stating she has not bled in over 5 years, She is past menapause. She has now started bleeding as of 1 week ago. Steady flow and clots. Our next appt here is Nov. 17th. Please call her and advise what she can do.

## 2022-10-17 NOTE — TELEPHONE ENCOUNTER
Spoke with [place]. States she had started vaginal estrogen over a month ago and doing well. She is using twice weekly  No other new meds or changes that she has noticed; has never bled since menopause started  Used estrogen on Monday and started bleeding on Tuesday. Reports heavy bleeding with clots. Is using overnight pads and still soaking through  Started mild cramping today, and took Aleve with improvement  Advised we will order pelvic ultrasound at this time and proceed from there  Discussed concern for post-menopausal bleeding and malignancy and referral to [facility] for EMB.    Verbalizes understanding and no further questions at this time

## 2022-10-19 ENCOUNTER — TELEPHONE (OUTPATIENT)
Dept: OBGYN CLINIC | Age: 62
End: 2022-10-19

## 2022-10-19 DIAGNOSIS — N95.0 POSTMENOPAUSAL BLEEDING: Primary | ICD-10-CM

## 2022-10-19 NOTE — TELEPHONE ENCOUNTER
Pt called wanted to know if her usn results from Monday are ready. Pt informed the results are still pending, pt states she is still having large blood clots come out and wanted to know what she should do. Per Olivia Pt should go to ER to be evaluated. 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

## 2022-10-20 ENCOUNTER — HOSPITAL ENCOUNTER (OUTPATIENT)
Age: 62
Setting detail: SPECIMEN
Discharge: HOME | End: 2022-10-20

## 2022-10-20 DIAGNOSIS — N95.0 POSTMENOPAUSAL BLEEDING: ICD-10-CM

## 2022-10-20 LAB
ABSOLUTE EOS #: 0.11 K/UL (ref 0–0.44)
ABSOLUTE IMMATURE GRANULOCYTE: 0.04 K/UL (ref 0–0.3)
ABSOLUTE LYMPH #: 2.38 K/UL (ref 1.1–3.7)
ABSOLUTE MONO #: 0.41 K/UL (ref 0.1–1.2)
BASOPHILS # BLD: 0 % (ref 0–2)
BASOPHILS ABSOLUTE: 0.04 K/UL (ref 0–0.2)
EOSINOPHILS RELATIVE PERCENT: 1 % (ref 1–4)
FOLLICLE STIMULATING HORMONE: 36.1 MIU/ML (ref 25.8–134.8)
HCT VFR BLD CALC: 38.2 % (ref 36.3–47.1)
HEMOGLOBIN: 11 G/DL (ref 11.9–15.1)
IMMATURE GRANULOCYTES: 0 %
LYMPHOCYTES # BLD: 25 % (ref 24–43)
MCH RBC QN AUTO: 26.3 PG (ref 25.2–33.5)
MCHC RBC AUTO-ENTMCNC: 28.8 G/DL (ref 28.4–34.8)
MCV RBC AUTO: 91.4 FL (ref 82.6–102.9)
MONOCYTES # BLD: 4 % (ref 3–12)
NRBC AUTOMATED: 0 PER 100 WBC
PDW BLD-RTO: 13.2 % (ref 11.8–14.4)
PLATELET # BLD: 291 K/UL (ref 138–453)
PMV BLD AUTO: 10.1 FL (ref 8.1–13.5)
PROLACTIN: 11.56 NG/ML (ref 4.79–23.3)
RBC # BLD: 4.18 M/UL (ref 3.95–5.11)
SEG NEUTROPHILS: 70 % (ref 36–65)
SEGMENTED NEUTROPHILS ABSOLUTE COUNT: 6.75 K/UL (ref 1.5–8.1)
THYROXINE, FREE: 1.42 NG/DL (ref 0.93–1.7)
TSH SERPL DL<=0.05 MIU/L-ACNC: 0.62 UIU/ML (ref 0.3–5)
WBC # BLD: 9.7 K/UL (ref 3.5–11.3)

## 2022-11-22 ENCOUNTER — OFFICE VISIT (OUTPATIENT)
Dept: OBGYN CLINIC | Age: 62
End: 2022-11-22
Payer: COMMERCIAL

## 2022-11-22 VITALS
WEIGHT: 188 LBS | HEIGHT: 62 IN | DIASTOLIC BLOOD PRESSURE: 72 MMHG | BODY MASS INDEX: 34.6 KG/M2 | HEART RATE: 77 BPM | SYSTOLIC BLOOD PRESSURE: 120 MMHG

## 2022-11-22 DIAGNOSIS — N95.0 POSTMENOPAUSAL BLEEDING: Primary | ICD-10-CM

## 2022-11-22 DIAGNOSIS — R93.89 ENDOMETRIAL THICKENING ON ULTRASOUND: ICD-10-CM

## 2022-11-22 DIAGNOSIS — D25.1 INTRAMURAL AND SUBMUCOUS LEIOMYOMA OF UTERUS: ICD-10-CM

## 2022-11-22 DIAGNOSIS — D25.0 INTRAMURAL AND SUBMUCOUS LEIOMYOMA OF UTERUS: ICD-10-CM

## 2022-11-22 PROCEDURE — G8427 DOCREV CUR MEDS BY ELIG CLIN: HCPCS | Performed by: OBSTETRICS & GYNECOLOGY

## 2022-11-22 PROCEDURE — 99213 OFFICE O/P EST LOW 20 MIN: CPT | Performed by: OBSTETRICS & GYNECOLOGY

## 2022-11-22 PROCEDURE — G8484 FLU IMMUNIZE NO ADMIN: HCPCS | Performed by: OBSTETRICS & GYNECOLOGY

## 2022-11-22 PROCEDURE — G8417 CALC BMI ABV UP PARAM F/U: HCPCS | Performed by: OBSTETRICS & GYNECOLOGY

## 2022-11-22 PROCEDURE — 3017F COLORECTAL CA SCREEN DOC REV: CPT | Performed by: OBSTETRICS & GYNECOLOGY

## 2022-11-22 PROCEDURE — 1036F TOBACCO NON-USER: CPT | Performed by: OBSTETRICS & GYNECOLOGY

## 2022-11-22 ASSESSMENT — ENCOUNTER SYMPTOMS
BACK PAIN: 0
ABDOMINAL PAIN: 0
COUGH: 0
SHORTNESS OF BREATH: 0

## 2022-11-22 NOTE — PROGRESS NOTES
[facility] - [address] [address] [facility] [address]  Dept: [phone]  Dept Fax: [phone]    22    Chief Complaint   Patient presents with    Consultation       Carol Ann Ashley 63 y.o. is here for a consult for postmenopausal bleeding. She started having some postmenopausal bleeding about a month ago. She says that it lasted for 2 weeks. She says it was red blood and very clotty. She was having to wear a diaper because her bleeding was so heavy. She says she went through menopause about 5 years ago. She says that she had some spotting earlier this year. She is not sexually active. She had an ultrasound that showed a thickened endometrium. Review of Systems   Constitutional:  Negative for chills and fever. HENT:  Negative for congestion. Respiratory:  Negative for cough and shortness of breath. Cardiovascular:  Negative for chest pain and palpitations. Gastrointestinal:  Negative for abdominal pain. Genitourinary:  Positive for vaginal bleeding. Negative for dyspareunia and vaginal discharge. Musculoskeletal:  Negative for back pain. Neurological:  Negative for dizziness and light-headedness. Psychiatric/Behavioral:  The patient is not nervous/anxious. Gynecologic History  No LMP recorded.  Patient is postmenopausal.  Contraception: post menopausal status  Last Pap: 3/31/21  Results: normal  Last Mammogram: 3/31/22  Results: normal    Obstetric History  : 5  Para: 5  AB: 0    Past Medical History:   Diagnosis Date    Anemia     Fibroid, uterine     Fibromyalgia     Headache     History of postmenopausal bleeding     episode in ,     Hypothyroidism     Osteoarthritis     RA (rheumatoid arthritis) ([facility] [address]     UTI (urinary tract infection)      Past Surgical History:   Procedure Laterality Date     SECTION      CHOLECYSTECTOMY      TONSILLECTOMY       Allergies Allergen Reactions    Sulfa Antibiotics Shortness Of Breath    Aspirin      Not able to take do to G6PD       Current Outpatient Medications   Medication Sig Dispense Refill    naproxen (NAPROSYN) 500 MG tablet take 1 tablet by mouth every 12 hours for 5 days then take 1 tablet every 12 hours if needed      Multiple Vitamin (MULTIVITAMIN ADULT PO) Take by mouth      Cranberry 1000 MG CAPS Take by mouth      vitamin D3 (CHOLECALCIFEROL) 10 MCG (400 UNIT) TABS tablet Take 400 Units by mouth daily      estradiol (ESTRACE VAGINAL) 0.1 MG/GM vaginal cream Place 1 g vaginally See Admin Instructions 1gm 2x per week (Patient not taking: Reported on 11/22/2022) 1 each 3    amitriptyline (ELAVIL) 50 MG tablet Take 1 tablet by mouth nightly (Patient not taking: Reported on 11/22/2022) 30 tablet 5    topiramate (TOPAMAX) 25 MG tablet Take 1 tablet by mouth daily (Patient not taking: No sig reported) 30 tablet 5     No current facility-administered medications for this visit.      Social History     Socioeconomic History    Marital status: Single     Spouse name: Not on file    Number of children: Not on file    Years of education: Not on file    Highest education level: Not on file   Occupational History    Not on file   Tobacco Use    Smoking status: Never    Smokeless tobacco: Never   Vaping Use    Vaping Use: Never used   Substance and Sexual Activity    Alcohol use: No    Drug use: No    Sexual activity: Not Currently   Other Topics Concern    Not on file   Social History Narrative    Not on file     Social Determinants of Health     Financial Resource Strain: Not on file   Food Insecurity: Not on file   Transportation Needs: Not on file   Physical Activity: Not on file   Stress: Not on file   Social Connections: Not on file   Intimate Partner Violence: Not on file   Housing Stability: Not on file     Family History   Problem Relation Age of Onset    High Blood Pressure Mother     Cancer Maternal Aunt         lung Cancer Maternal Uncle         prostate, late 76s       Physical exam Physical Exam  Constitutional:       Appearance: Normal appearance. She is obese. HENT:      Head: Normocephalic. Eyes:      Extraocular Movements: Extraocular movements intact. Pulmonary:      Effort: Pulmonary effort is normal.   Neurological:      Mental Status: She is alert and oriented to person, place, and time. Psychiatric:         Mood and Affect: Mood normal.         Behavior: Behavior normal.         Thought Content: Thought content normal.         TVUS 10/17/22:  PELVIC AND TRANSVAGINAL ULTRASOUND (NON-OB)       UTERUS: 8.72 x 5.84 x 5.40 cm   Anteverted, heterogeneous       ENDO: 1.18 cm   Thickened, difficult to visualize due to fibroid       RT. OVARY: 1.61 x  1.32 x 1.03 cm   unremarkable       LT. OVARY: Not visualized       No pelvic free fluid seen       1 submucosal fibroid seen mid [place] measuring: 3.97 x 3.36 x 4.04 cm   1 intramural fibroid seen rt [place] measuring: 2.59 x 3.06 x 2.47 cm       Thickened endometrial stripe. Should have sampling with a EMB or D&C    since having postmenopausal bleeding. Multiple fibroids noted as well. Assessment/Plan   1. Postmenopausal bleeding  2. Endometrial thickening on ultrasound  - Discussed with pt most common causes of postmenopausal bleeding. Discussed with pt that she does have a fibroid close to the endometrium which can cause bleeding to occur, but that I would recommend a sampling of the lining of the uterus since her endometrial stripe is thickened. - Discussed need to rule out endometrial cancer.   - Pt given options of EMB vs hysteroscopy D&C, risks and benefits of each discussed, and she would like to proceed with an EMB for financial reasons. All questions answered.       Will schedule pt for EMB  Mil Arnold MD  [address]

## 2022-12-27 ENCOUNTER — TELEPHONE (OUTPATIENT)
Dept: OBGYN CLINIC | Age: 62
End: 2022-12-27

## 2022-12-27 NOTE — TELEPHONE ENCOUNTER
Pt called she has an EMB 01/03 for earlier bleeding her bleeding stopped then she is now having spotting on and off again she is wondering what you recommend please advise

## 2023-01-02 NOTE — PROGRESS NOTES
1/3/2023    Ranjana Shrestha is a 63 y.o. female,       No LMP recorded. Patient is postmenopausal.    Chief Complaint   Patient presents with    Procedure     EMB         LABS:  UPT: postmenopausal    No visits with results within 6 Week(s) from this visit. Latest known visit with results is:   Hospital Outpatient Visit on 10/20/2022   Component Date Value Ref Range Status    TSH 10/20/2022 0.62  0.30 - 5.00 uIU/mL Final    Thyroxine, Free 10/20/2022 1.42  0.93 - 1.70 ng/dL Final    Prolactin 10/20/2022 11.56  4.79 - 23.30 ng/mL Final    Comment: The presence of macroprolactin may cause interference in female patients with various   endocrinological diseases or during pregnancy. [address] 10/20/2022 36.1  25.8 - 134.8 mIU/mL Final    Comment: Reference Range:  Male:                        1. 5-12.4  Ovulating Female:     Follicular Phase           3.5-12.5    Ovulation Phase            4.7-21.5    Luteal Phase               1.7-7.7  Postmenopausal Female:      25.8-134.8      WBC 10/20/2022 9.7  3.5 - 11.3 k/uL Final    RBC 10/20/2022 4.18  3.95 - 5.11 m/uL Final    Hemoglobin 10/20/2022 11.0 (A)  11.9 - 15.1 g/dL Final    Hematocrit 10/20/2022 38.2  36.3 - 47.1 % Final    MCV 10/20/2022 91.4  82.6 - 102.9 fL Final    MCH 10/20/2022 26.3  25.2 - 33.5 pg Final    MCHC 10/20/2022 28.8  28.4 - 34.8 g/dL Final    RDW 10/20/2022 13.2  11.8 - 14.4 % Final    Platelets  291  138 - 453 k/uL Final    MPV 10/20/2022 10.1  8.1 - 13.5 fL Final    NRBC Automated 10/20/2022 0.0  0.0 per 100 WBC Final    Seg Neutrophils 10/20/2022 70 (A)  36 - 65 % Final    Lymphocytes 10/20/2022 25  24 - 43 % Final    Monocytes 10/20/2022 4  3 - 12 % Final    Eosinophils % 10/20/2022 1  1 - 4 % Final    Basophils 10/20/2022 0  0 - 2 % Final    Immature Granulocytes 10/20/2022 0  0 % Final    Segs Absolute 10/20/2022 6.75  1.50 - 8.10 k/uL Final    Absolute Lymph # 10/20/2022 2.38  1.10 - 3.70 k/uL Final    Absolute Mono # 10/20/2022 0.41  0.10 - 1.20 k/uL Final    Absolute Eos # 10/20/2022 0.11  0.00 - 0.44 k/uL Final    Basophils Absolute 10/20/2022 0.04  0.00 - 0.20 k/uL Final    Absolute Immature Granulocyte 10/20/2022 0.04  0.00 - 0.30 k/uL Final   ]    Past Medical History:   Diagnosis Date    Anemia     Fibroid, uterine     Fibromyalgia     Headache     History of postmenopausal bleeding     episode in ,     Hypothyroidism     Osteoarthritis     RA (rheumatoid arthritis) ([facility] [address]     UTI (urinary tract infection)          Past Surgical History:   Procedure Laterality Date     SECTION      CHOLECYSTECTOMY      TONSILLECTOMY           Family History   Problem Relation Age of Onset    High Blood Pressure Mother     Cancer Maternal Aunt         lung     Cancer Maternal Uncle         prostate, late 76s         Social History     Tobacco Use    Smoking status: Never    Smokeless tobacco: Never   Vaping Use    Vaping Use: Never used   Substance Use Topics    Alcohol use: No    Drug use: No         Current Outpatient Medications   Medication Sig Dispense Refill    naproxen (NAPROSYN) 500 MG tablet take 1 tablet by mouth every 12 hours for 5 days then take 1 tablet every 12 hours if needed      Multiple Vitamin (MULTIVITAMIN ADULT PO) Take by mouth      Cranberry 1000 MG CAPS Take by mouth      vitamin D3 (CHOLECALCIFEROL) 10 MCG (400 UNIT) TABS tablet Take 400 Units by mouth daily      estradiol (ESTRACE VAGINAL) 0.1 MG/GM vaginal cream Place 1 g vaginally See Admin Instructions 1gm 2x per week (Patient not taking: Reported on 2022) 1 each 3    amitriptyline (ELAVIL) 50 MG tablet Take 1 tablet by mouth nightly (Patient not taking: Reported on 2022) 30 tablet 5    topiramate (TOPAMAX) 25 MG tablet Take 1 tablet by mouth daily (Patient not taking: No sig reported) 30 tablet 5     No current facility-administered medications for this visit.          Allergies as of 2023 - Fully Reviewed 2023   Allergen Reaction Noted    Sulfa antibiotics Shortness Of Breath 07/07/2016    Aspirin  07/07/2016         Diagnostics:  No results found. Blood pressure 122/74, pulse 79, height 5' 2\" (1.575 m), weight 190 lb (86.2 kg), not currently breastfeeding. Chaperone for Intimate Exam  Chaperone was offered and accepted as part of the rooming process. Chaperone: Graham AN time out was called by the Chaperone listed above while ALL participants were quiet and attentive. The procedure to be completed was confirmed, with the appropriate laterality demarcated prior, if appropriate, as well as the patients name and a unique identifier, her date of birth. All questions were answered to her satisfaction. The consent was signed prior to the time out and all the risks were discussed in detail. The patient was counseled on the procedure. Risks, benefits and alternatives were reviewed. The patient is aware that this is diagnostic and not curative and a second procedure may be needed. A consent was reviewed and obtained. The patient was positioned comfortably on the exam table. After a bi-manual exam; the uterus was found to be  anteverted with a size of 7 cm. There was no adnexal masses and the bladder was smooth, non-tender and without palpable masses. A sterile speculum was placed into the vagina and the cervix was identified. It was stabilized with an allis clamp. It was cleansed with betadine and the aspirator was then gently passed into the endometrial cavity. Tissue was obtained and sent to pathology. The patient tolerated the procedure well. Post procedure restrictions were reviewed and given to the patient. .  All counts and instruments were correct at the end of the procedure. Assessment:   Diagnosis Orders   1. Post-menopause bleeding  Surgical Pathology    78377 - [place] BIOPSY OF UTERUS LINING      2.  Endometrial thickening on ultrasound  Surgical Pathology    74995 - [place] BIOPSY OF UTERUS LINING        Patient Active Problem List    Diagnosis Date Noted    Postmenopausal bleeding 10/17/2022     Priority: Medium    Uterine fibroid 04/14/2021    Vaginal polyp 04/14/2021    G6PD deficiency 04/02/2021    Migraine without status migrainosus, not intractable 02/21/2017    Rheumatoid arthritis ([facility] [address] 07/07/2016    Vitamin D deficiency 07/07/2016    History of Helicobacter pylori infection 07/07/2016           PLAN:  Will call pt with results of EMB. If she continues to have bleeding will start on progesterone.       Esvin Sullivan MD

## 2023-01-03 ENCOUNTER — HOSPITAL ENCOUNTER (OUTPATIENT)
Age: 63
Setting detail: SPECIMEN
Discharge: HOME | End: 2023-01-03

## 2023-01-03 ENCOUNTER — PROCEDURE VISIT (OUTPATIENT)
Dept: OBGYN CLINIC | Age: 63
End: 2023-01-03

## 2023-01-03 VITALS
BODY MASS INDEX: 34.96 KG/M2 | DIASTOLIC BLOOD PRESSURE: 74 MMHG | HEART RATE: 79 BPM | HEIGHT: 62 IN | WEIGHT: 190 LBS | SYSTOLIC BLOOD PRESSURE: 122 MMHG

## 2023-01-03 DIAGNOSIS — N95.0 POST-MENOPAUSE BLEEDING: Primary | ICD-10-CM

## 2023-01-03 DIAGNOSIS — R93.89 ENDOMETRIAL THICKENING ON ULTRASOUND: ICD-10-CM

## 2023-01-05 LAB — SURGICAL PATHOLOGY REPORT: NORMAL

## 2023-02-13 ENCOUNTER — TELEPHONE (OUTPATIENT)
Dept: OBGYN CLINIC | Age: 63
End: 2023-02-13

## 2023-02-13 NOTE — TELEPHONE ENCOUNTER
Kathrin Foster called and started to bleed on 02/10/23 had a few clots in toilet and wore a pad and on 02/11/23 only had bleeding when she wiped and nothing today You did biopsy on 01/04/23 which was negative ?  Next step also wants you to know she is anemic per patient

## 2023-03-22 ENCOUNTER — TELEPHONE (OUTPATIENT)
Dept: OBGYN CLINIC | Age: 63
End: 2023-03-22

## 2023-03-23 NOTE — TELEPHONE ENCOUNTER
Pt notified. Would prefer to go to see the midwives their location is closer for her. Pt transferred to that office via phone call.
Would recommend an ER Follow Up appt and we can discuss her getting and Ultrasound at that appointment if needed.     Thanks,    DO Candelario Ly Ob/Gyn   3/23/2023, 12:11 PM
fibroids. The bowel is nondistended. Patient status post cystectomy. Lung bases are clear. The pancreas and adrenals are unremarkable. Portal and mesenteric vessels enhance  normally. IMPRESSION:    No acute abdominal findings. Heterogeneous uterine attenuation with what appears to be prominence of the endometrium. Although this may represent uterine fibroids, pelvic ultrasound recommended to exclude endometrial thickening. All CT scans at this facility use dose modulation, iterative reconstruction, and/or weight based dosing when appropriate to reduce radiation dose to as low as reasonably achievable. [id number]    Finalized by Blaine Everett MD on 3/22/2023 11:04 AM      Imaging Results - CT abdomen and pelvis with contrast (03/22/2023 10:57 AM EDT)   Procedure Note   Basilia Frankel MD - 03/22/2023 Formatting of this note might be different from the original.  CLINICAL INFORMATION:     RLQ abdominal pain (Age >= 14y)    TECHNIQUE:     CT ABDOMEN AND PELVIS W CONT    CT images of the abdomen and pelvis were obtained post intravenous administration of contrast. Comparison is made to prior exam dated April 15, 2022. What appears to be the appendix is normal in caliber. There is no free peritoneal fluid or air. Uterus appears heterogeneous with what appears to   be prominence of the endometrium. Pelvic ultrasound recommended for further evaluation. I suspect there are also uterine fibroids. The bowel is nondistended. Patient status post cystectomy. Lung bases are clear. The pancreas and adrenals are unremarkable. Portal and mesenteric vessels enhance  normally. IMPRESSION:    No acute abdominal findings. Heterogeneous uterine attenuation with what appears to be prominence of the endometrium. Although this may represent uterine fibroids, pelvic ultrasound recommended to exclude endometrial thickening.     All CT scans at this facility use dose modulation, iterative reconstruction, and/or

## 2023-04-18 ENCOUNTER — HOSPITAL ENCOUNTER (OUTPATIENT)
Age: 63
Setting detail: SPECIMEN
Discharge: HOME | End: 2023-04-18

## 2023-04-18 ENCOUNTER — OFFICE VISIT (OUTPATIENT)
Dept: OBGYN CLINIC | Age: 63
End: 2023-04-18
Payer: COMMERCIAL

## 2023-04-18 VITALS
HEART RATE: 76 BPM | SYSTOLIC BLOOD PRESSURE: 118 MMHG | WEIGHT: 191 LBS | BODY MASS INDEX: 35.15 KG/M2 | HEIGHT: 62 IN | DIASTOLIC BLOOD PRESSURE: 70 MMHG

## 2023-04-18 DIAGNOSIS — N89.8 VAGINAL ITCHING: Primary | ICD-10-CM

## 2023-04-18 DIAGNOSIS — N89.8 VAGINAL ITCHING: ICD-10-CM

## 2023-04-18 PROCEDURE — 1036F TOBACCO NON-USER: CPT | Performed by: ADVANCED PRACTICE MIDWIFE

## 2023-04-18 PROCEDURE — G8427 DOCREV CUR MEDS BY ELIG CLIN: HCPCS | Performed by: ADVANCED PRACTICE MIDWIFE

## 2023-04-18 PROCEDURE — 99213 OFFICE O/P EST LOW 20 MIN: CPT | Performed by: ADVANCED PRACTICE MIDWIFE

## 2023-04-18 PROCEDURE — 3017F COLORECTAL CA SCREEN DOC REV: CPT | Performed by: ADVANCED PRACTICE MIDWIFE

## 2023-04-18 PROCEDURE — G8417 CALC BMI ABV UP PARAM F/U: HCPCS | Performed by: ADVANCED PRACTICE MIDWIFE

## 2023-04-18 NOTE — PROGRESS NOTES
Patient is here to discuss CT results from [facility] on 3/22 and believes she has BV . Pt has a white discharge with no odor.
Not on file   Intimate Partner Violence: Not on file   Housing Stability: Not on file      Allergies   Allergen Reactions    Sulfa Antibiotics Shortness Of Breath    Aspirin      Not able to take do to G6PD          No current outpatient medications on file. No current facility-administered medications for this visit. Review of Systems: all 10 other organ systems reviewed and are negative except those noted in HPI    O: Physical Examination  Vital Signs:    /70 (Site: Left Upper Arm, Position: Sitting, Cuff Size: Large Adult)   Pulse 76   Ht 5' 2\" (1.575 m)   Wt 191 lb (86.6 kg)   BMI 34.93 kg/m²    Body mass index is 34.93 kg/m². General: appears alert, oriented and cooperative. Skin: Normal in appearance, texture, and temperature; No lesions  Heart: RRR, no arrhythmia; no additional heart sounds auscultated  Lungs: no cough, Lungs are clear to auscultation bilaterally  Genitourinary:    External genitalia: atrophic labia, clitoris, urethral orifice and introitus WNL  Vagina: normal pink vaginal walls and mucosa with normal rugae, no masses or tenderness clear mucous discharge noted  Cervix: pink, no masses, CMT, or discharge    Diagnostic testing    Orders Placed This Encounter   Procedures    Vaginitis DNA Probe     Standing Status:   Future     Standing Expiration Date:   4/14/2024    Chlamydia/GC DNA, Urine     Standing Status:   Future     Standing Expiration Date:   4/18/2024       A:     Visit Diagnoses         Codes    Vaginal itching    -  Primary N89.8            P:       Probiotics daily   No doushing, cotton underwear, 1/2c baking soda in a warm bath. Today's visit was 15 minutes of face to face. Notify clinic if postmenopausal bleeding returns   Follow up in 1 week if not improved or PRN.

## 2023-04-19 LAB
CANDIDA SPECIES, DNA PROBE: NEGATIVE
GARDNERELLA VAGINALIS, DNA PROBE: NEGATIVE
SOURCE: NORMAL
TRICHOMONAS VAGINALIS DNA: NEGATIVE

## 2023-04-20 ENCOUNTER — HOSPITAL ENCOUNTER (OUTPATIENT)
Age: 63
Setting detail: SPECIMEN
Discharge: HOME | End: 2023-04-20

## 2023-04-21 DIAGNOSIS — N89.8 VAGINAL ITCHING: ICD-10-CM

## 2023-04-24 LAB
CHLAMYDIA DNA UR QL NAA+PROBE: NEGATIVE
N GONORRHOEA DNA UR QL NAA+PROBE: NEGATIVE
SPECIMEN DESCRIPTION: NORMAL

## 2024-03-07 ENCOUNTER — OFFICE VISIT (OUTPATIENT)
Dept: OBGYN CLINIC | Age: 64
End: 2024-03-07
Payer: COMMERCIAL

## 2024-03-07 ENCOUNTER — HOSPITAL ENCOUNTER (OUTPATIENT)
Age: 64
Setting detail: SPECIMEN
Discharge: HOME | End: 2024-03-07

## 2024-03-07 VITALS
BODY MASS INDEX: 34.32 KG/M2 | WEIGHT: 186.5 LBS | HEART RATE: 82 BPM | DIASTOLIC BLOOD PRESSURE: 74 MMHG | HEIGHT: 62 IN | SYSTOLIC BLOOD PRESSURE: 110 MMHG

## 2024-03-07 DIAGNOSIS — Z01.419 WOMEN'S ANNUAL ROUTINE GYNECOLOGICAL EXAMINATION: Primary | ICD-10-CM

## 2024-03-07 DIAGNOSIS — Z12.4 CERVICAL CANCER SCREENING: ICD-10-CM

## 2024-03-07 DIAGNOSIS — N63.41 SUBAREOLAR MASS OF RIGHT BREAST: ICD-10-CM

## 2024-03-07 DIAGNOSIS — N95.0 POST-MENOPAUSE BLEEDING: ICD-10-CM

## 2024-03-07 PROCEDURE — 99396 PREV VISIT EST AGE 40-64: CPT | Performed by: ADVANCED PRACTICE MIDWIFE

## 2024-03-07 RX ORDER — OMEPRAZOLE 40 MG/1
40 CAPSULE, DELAYED RELEASE ORAL EVERY MORNING
COMMUNITY
Start: 2024-02-28

## 2024-03-07 SDOH — ECONOMIC STABILITY: FOOD INSECURITY: WITHIN THE PAST 12 MONTHS, THE FOOD YOU BOUGHT JUST DIDN'T LAST AND YOU DIDN'T HAVE MONEY TO GET MORE.: NEVER TRUE

## 2024-03-07 SDOH — ECONOMIC STABILITY: FOOD INSECURITY: WITHIN THE PAST 12 MONTHS, YOU WORRIED THAT YOUR FOOD WOULD RUN OUT BEFORE YOU GOT MONEY TO BUY MORE.: NEVER TRUE

## 2024-03-07 SDOH — ECONOMIC STABILITY: HOUSING INSECURITY
IN THE LAST 12 MONTHS, WAS THERE A TIME WHEN YOU DID NOT HAVE A STEADY PLACE TO SLEEP OR SLEPT IN A SHELTER (INCLUDING NOW)?: NO

## 2024-03-07 SDOH — ECONOMIC STABILITY: INCOME INSECURITY: HOW HARD IS IT FOR YOU TO PAY FOR THE VERY BASICS LIKE FOOD, HOUSING, MEDICAL CARE, AND HEATING?: NOT HARD AT ALL

## 2024-03-07 ASSESSMENT — ANXIETY QUESTIONNAIRES
4. TROUBLE RELAXING: 0
GAD7 TOTAL SCORE: 0
1. FEELING NERVOUS, ANXIOUS, OR ON EDGE: 0
2. NOT BEING ABLE TO STOP OR CONTROL WORRYING: 0
IF YOU CHECKED OFF ANY PROBLEMS ON THIS QUESTIONNAIRE, HOW DIFFICULT HAVE THESE PROBLEMS MADE IT FOR YOU TO DO YOUR WORK, TAKE CARE OF THINGS AT HOME, OR GET ALONG WITH OTHER PEOPLE: NOT DIFFICULT AT ALL
5. BEING SO RESTLESS THAT IT IS HARD TO SIT STILL: 0
7. FEELING AFRAID AS IF SOMETHING AWFUL MIGHT HAPPEN: 0
6. BECOMING EASILY ANNOYED OR IRRITABLE: 0
3. WORRYING TOO MUCH ABOUT DIFFERENT THINGS: 0

## 2024-03-07 ASSESSMENT — PATIENT HEALTH QUESTIONNAIRE - PHQ9
SUM OF ALL RESPONSES TO PHQ9 QUESTIONS 1 & 2: 0
SUM OF ALL RESPONSES TO PHQ QUESTIONS 1-9: 0
1. LITTLE INTEREST OR PLEASURE IN DOING THINGS: 0
SUM OF ALL RESPONSES TO PHQ QUESTIONS 1-9: 0
2. FEELING DOWN, DEPRESSED OR HOPELESS: 0

## 2024-03-07 ASSESSMENT — ENCOUNTER SYMPTOMS
ALLERGIC/IMMUNOLOGIC NEGATIVE: 1
RESPIRATORY NEGATIVE: 1
GASTROINTESTINAL NEGATIVE: 1
EYES NEGATIVE: 1

## 2024-03-07 NOTE — PROGRESS NOTES
Patient is here for yearly annual exam.       Chaperone for Intimate Exam  Chaperone was offered as part of the rooming process. Patient declined and agrees to continue with exam without a chaperone.  Chaperone:      
  Current Outpatient Medications on File Prior to Visit   Medication Sig Dispense Refill    omeprazole (PRILOSEC) 40 MG delayed release capsule Take 1 capsule by mouth every morning       No current facility-administered medications on file prior to visit.      Past Medical History:   Diagnosis Date    Anemia     Fibroid, uterine     Fibromyalgia     Headache     History of postmenopausal bleeding     episode in 2021, 2017    Hypothyroidism     Osteoarthritis     RA (rheumatoid arthritis) ([facility])     UTI (urinary tract infection)      Gynecologic History:  Menopause:     Sexually active: Yes  New Sex Partner within 3 months: No    STD history: No     Physical Exam  Vitals reviewed.   Constitutional:       General: She is not in acute distress.     Appearance: Normal appearance. She is not ill-appearing, toxic-appearing or diaphoretic.   Cardiovascular:      Rate and Rhythm: Normal rate.   Pulmonary:      Effort: Pulmonary effort is normal. No respiratory distress.   Chest:   Breasts:     Right: Mass (subareolar approx 1 cm x 1 cm movable firm) present. No inverted nipple, nipple discharge or skin change.      Left: Normal.   Genitourinary:     General: Normal vulva.      Exam position: Supine.      Labia:         Right: No tenderness or lesion.         Left: No tenderness or lesion.       Vagina: No vaginal discharge, tenderness or lesions.      Cervix: Friability present. No erythema or eversion.      Comments: Vaginal walls smooth, introitus pale  Lymphadenopathy:      Lower Body: No right inguinal adenopathy. No left inguinal adenopathy.   Skin:     General: Skin is warm and dry.   Neurological:      Mental Status: She is alert and oriented to person, place, and time. Mental status is at baseline.   Psychiatric:         Mood and Affect: Mood normal.         Behavior: Behavior normal.         Thought Content: Thought content normal.         Judgment: Judgment normal.         Chaperone for Intimate Exam  Chaperone

## 2024-03-08 DIAGNOSIS — Z01.419 WOMEN'S ANNUAL ROUTINE GYNECOLOGICAL EXAMINATION: ICD-10-CM

## 2024-03-08 LAB
C TRACH DNA SPEC QL PROBE+SIG AMP: NEGATIVE
HPV I/H RISK 4 DNA CVX QL NAA+PROBE: NOT DETECTED
HPV SAMPLE: NORMAL
HPV, INTERPRETATION: NORMAL
HPV18 DNA CVX QL NAA+PROBE: NOT DETECTED
SPECIMEN DESCRIPTION: NORMAL

## 2024-03-11 DIAGNOSIS — N95.0 POST-MENOPAUSE BLEEDING: Primary | ICD-10-CM

## 2024-03-11 DIAGNOSIS — N95.0 POSTMENOPAUSAL BLEEDING: ICD-10-CM

## 2024-03-19 LAB — CYTOLOGY REPORT: NORMAL

## 2024-03-21 ENCOUNTER — HOSPITAL ENCOUNTER (OUTPATIENT)
Dept: ULTRASOUND IMAGING | Age: 64
Discharge: HOME | End: 2024-03-23
Payer: COMMERCIAL

## 2024-03-21 DIAGNOSIS — N95.0 POST-MENOPAUSE BLEEDING: ICD-10-CM

## 2024-03-21 PROCEDURE — 76830 TRANSVAGINAL US NON-OB: CPT

## 2024-03-21 PROCEDURE — 76856 US EXAM PELVIC COMPLETE: CPT

## 2024-03-25 ENCOUNTER — TELEPHONE (OUTPATIENT)
Dept: OBGYN CLINIC | Age: 64
End: 2024-03-25

## 2024-03-25 DIAGNOSIS — N95.0 POST-MENOPAUSE BLEEDING: ICD-10-CM

## 2024-03-25 DIAGNOSIS — R93.89 ENDOMETRIAL THICKENING ON ULTRASOUND: Primary | ICD-10-CM

## 2024-03-25 NOTE — TELEPHONE ENCOUNTER
Reviewed ultrasound findings with Steffany and Dr. Marrufo's recommendation for another EMB. She is agreeable, consult placed and reviewed with Steffany that their office will call her to get the procedure scheduled. She has no further questions at this time.

## 2024-05-24 ENCOUNTER — INITIAL CONSULT (OUTPATIENT)
Dept: OBGYN CLINIC | Age: 64
End: 2024-05-24
Payer: COMMERCIAL

## 2024-05-24 VITALS
HEIGHT: 62 IN | BODY MASS INDEX: 33.31 KG/M2 | SYSTOLIC BLOOD PRESSURE: 114 MMHG | HEART RATE: 73 BPM | DIASTOLIC BLOOD PRESSURE: 66 MMHG | WEIGHT: 181 LBS

## 2024-05-24 DIAGNOSIS — N95.0 POSTMENOPAUSAL BLEEDING: Primary | ICD-10-CM

## 2024-05-24 DIAGNOSIS — R93.89 ENDOMETRIAL THICKENING ON ULTRASOUND: ICD-10-CM

## 2024-05-24 PROCEDURE — 99213 OFFICE O/P EST LOW 20 MIN: CPT | Performed by: OBSTETRICS & GYNECOLOGY

## 2024-05-24 ASSESSMENT — ENCOUNTER SYMPTOMS
COUGH: 0
SHORTNESS OF BREATH: 0
BACK PAIN: 0
ABDOMINAL PAIN: 0

## 2024-06-21 ENCOUNTER — TELEPHONE (OUTPATIENT)
Dept: OBGYN CLINIC | Age: 64
End: 2024-06-21

## 2024-06-21 NOTE — TELEPHONE ENCOUNTER
I left Steffany a message to call. I was unable to find an alternate surgery date with a later time & OR availability. I was able to move her surgery to 9:10am on Thursday 6/27/24 but she will still need to be at the hospital at 7:00am.  Asked her to call back to discuss.

## 2024-06-27 ENCOUNTER — PATIENT MESSAGE (OUTPATIENT)
Dept: OBGYN CLINIC | Age: 64
End: 2024-06-27

## 2024-08-06 ENCOUNTER — HOSPITAL ENCOUNTER (OUTPATIENT)
Age: 64
Setting detail: SPECIMEN
Discharge: HOME | End: 2024-08-06

## 2024-08-06 ENCOUNTER — OFFICE VISIT (OUTPATIENT)
Dept: OBGYN CLINIC | Age: 64
End: 2024-08-06
Payer: COMMERCIAL

## 2024-08-06 VITALS
BODY MASS INDEX: 33.55 KG/M2 | SYSTOLIC BLOOD PRESSURE: 114 MMHG | HEIGHT: 62 IN | DIASTOLIC BLOOD PRESSURE: 62 MMHG | WEIGHT: 182.3 LBS | HEART RATE: 71 BPM

## 2024-08-06 DIAGNOSIS — R82.90 FOUL SMELLING URINE: ICD-10-CM

## 2024-08-06 DIAGNOSIS — N89.8 VAGINAL DISCHARGE: Primary | ICD-10-CM

## 2024-08-06 DIAGNOSIS — N89.8 VAGINAL DISCHARGE: ICD-10-CM

## 2024-08-06 PROCEDURE — 99214 OFFICE O/P EST MOD 30 MIN: CPT

## 2024-08-06 NOTE — PROGRESS NOTES
Patient is here for vaginal discharge. Patient reports yellow discharge for 3 days. Patient reports no vaginal odor, no vaginal itching.   Patient reports dark urine, strong smell to urine.   
Chase Hillmanbrunilda, last visit 6/10/2024    UTI (urinary tract infection)     Wears glasses        Last PAP was normal; March/2024.       Physical Exam  Vitals reviewed.   HENT:      Nose: Nose normal.      Mouth/Throat:      Mouth: Mucous membranes are moist.      Pharynx: Oropharynx is clear.   Cardiovascular:      Rate and Rhythm: Normal rate.      Pulses: Normal pulses.   Pulmonary:      Effort: Pulmonary effort is normal.   Abdominal:      General: Abdomen is flat.   Genitourinary:     Comments: deferred  Musculoskeletal:         General: Normal range of motion.      Cervical back: Normal range of motion.   Skin:     General: Skin is warm and dry.      Capillary Refill: Capillary refill takes less than 2 seconds.   Neurological:      Mental Status: She is alert and oriented to person, place, and time.   Psychiatric:         Mood and Affect: Mood normal.         Behavior: Behavior normal.         Thought Content: Thought content normal.          Chaperone for Intimate Exam  Chaperone was offered as part of the rooming process. Patient declined and agrees to continue with exam without a chaperone.  Chaperone: n/a  Assessment/Plan:    Steffany was seen today for vaginal discharge.    Diagnoses and all orders for this visit:    Vaginal discharge  -     Chlamydia/GC DNA, Urine; Future  -     Vaginitis DNA Probe; Future  Vaginitis DNA Probe and Urine collected.  Discussed vaginal hygiene. Steffany instructed to avoid products with fragrance, use cotton-only underwear, change out of wet clothes immediately, use cotton-only liners and pads when needed (no nylon), and monitor for irritants.   Discussed use of boric acid suppositories.    Foul smelling urine  -     Culture, Urine; Future    Return if symptoms worsen or fail to improve.    Problem list reviewed and updated as indicated.   Upon completion of the visit all questions were answered.  History was reviewed as documented on Epic Navigator.    The patient, Steffany Luna,

## 2024-08-07 DIAGNOSIS — A59.01 TRICHOMONAS VAGINALIS (TV) INFECTION: Primary | ICD-10-CM

## 2024-08-07 LAB
CANDIDA SPECIES: NEGATIVE
CHLAMYDIA DNA UR QL NAA+PROBE: NEGATIVE
GARDNERELLA VAGINALIS: NEGATIVE
N GONORRHOEA DNA UR QL NAA+PROBE: NEGATIVE
SOURCE: ABNORMAL
SPECIMEN DESCRIPTION: NORMAL
TRICHOMONAS: POSITIVE

## 2024-08-07 RX ORDER — METRONIDAZOLE 500 MG/1
500 TABLET ORAL 2 TIMES DAILY
Qty: 14 TABLET | Refills: 0 | Status: SHIPPED | OUTPATIENT
Start: 2024-08-07 | End: 2024-08-14

## 2024-08-08 ENCOUNTER — TELEPHONE (OUTPATIENT)
Dept: OBGYN | Age: 64
End: 2024-08-08

## 2024-08-08 DIAGNOSIS — N30.00 ACUTE CYSTITIS WITHOUT HEMATURIA: Primary | ICD-10-CM

## 2024-08-08 LAB
MICROORGANISM SPEC CULT: ABNORMAL
SERVICE CMNT-IMP: ABNORMAL
SPECIMEN DESCRIPTION: ABNORMAL

## 2024-08-08 RX ORDER — NITROFURANTOIN 25; 75 MG/1; MG/1
100 CAPSULE ORAL 2 TIMES DAILY
Qty: 14 CAPSULE | Refills: 0 | Status: SHIPPED | OUTPATIENT
Start: 2024-08-08 | End: 2024-08-15

## 2024-09-16 ENCOUNTER — HOSPITAL ENCOUNTER (OUTPATIENT)
Age: 64
Setting detail: SPECIMEN
Discharge: HOME | End: 2024-09-16

## 2024-09-16 ENCOUNTER — OFFICE VISIT (OUTPATIENT)
Dept: OBGYN CLINIC | Age: 64
End: 2024-09-16
Payer: COMMERCIAL

## 2024-09-16 VITALS
HEIGHT: 62 IN | DIASTOLIC BLOOD PRESSURE: 68 MMHG | BODY MASS INDEX: 32.87 KG/M2 | SYSTOLIC BLOOD PRESSURE: 118 MMHG | HEART RATE: 96 BPM | WEIGHT: 178.6 LBS

## 2024-09-16 DIAGNOSIS — N94.9 VAGINAL DISCOMFORT: ICD-10-CM

## 2024-09-16 DIAGNOSIS — Z86.19 HISTORY OF TRICHOMONAL VAGINITIS: ICD-10-CM

## 2024-09-16 DIAGNOSIS — N94.9 VAGINAL DISCOMFORT: Primary | ICD-10-CM

## 2024-09-16 PROCEDURE — 99214 OFFICE O/P EST MOD 30 MIN: CPT | Performed by: ADVANCED PRACTICE MIDWIFE

## 2024-09-16 RX ORDER — CLOTRIMAZOLE 10 MG/1
10 LOZENGE ORAL
COMMUNITY

## 2024-09-16 ASSESSMENT — ENCOUNTER SYMPTOMS
GASTROINTESTINAL NEGATIVE: 1
EYES NEGATIVE: 1
ALLERGIC/IMMUNOLOGIC NEGATIVE: 1
RESPIRATORY NEGATIVE: 1

## 2024-09-17 LAB
CANDIDA SPECIES: NEGATIVE
GARDNERELLA VAGINALIS: NEGATIVE
SOURCE: NORMAL
TRICHOMONAS: NEGATIVE

## 2025-05-12 NOTE — PROGRESS NOTES
Pt is here for anal pain and some bleeding when wiping, skin seems to be peeling   Sx about 2 weeks sx seemed to have started after finishing abx

## 2025-05-15 ENCOUNTER — OFFICE VISIT (OUTPATIENT)
Dept: OBGYN CLINIC | Age: 65
End: 2025-05-15
Payer: COMMERCIAL

## 2025-05-15 VITALS
SYSTOLIC BLOOD PRESSURE: 128 MMHG | BODY MASS INDEX: 34.63 KG/M2 | HEIGHT: 62 IN | DIASTOLIC BLOOD PRESSURE: 70 MMHG | WEIGHT: 188.2 LBS

## 2025-05-15 DIAGNOSIS — N76.0 RECURRENT VAGINITIS: Primary | ICD-10-CM

## 2025-05-15 DIAGNOSIS — R35.0 URINARY FREQUENCY: ICD-10-CM

## 2025-05-15 DIAGNOSIS — Z12.11 SCREENING FOR COLON CANCER: ICD-10-CM

## 2025-05-15 PROCEDURE — 99214 OFFICE O/P EST MOD 30 MIN: CPT

## 2025-05-15 RX ORDER — SUMATRIPTAN SUCCINATE 100 MG/1
TABLET ORAL
COMMUNITY
Start: 2025-03-03

## 2025-05-15 RX ORDER — IBUPROFEN 800 MG/1
TABLET, FILM COATED ORAL
COMMUNITY
Start: 2025-02-18

## 2025-05-15 SDOH — ECONOMIC STABILITY: TRANSPORTATION INSECURITY
IN THE PAST 12 MONTHS, HAS LACK OF TRANSPORTATION KEPT YOU FROM MEETINGS, WORK, OR FROM GETTING THINGS NEEDED FOR DAILY LIVING?: NO

## 2025-05-15 SDOH — ECONOMIC STABILITY: FOOD INSECURITY: WITHIN THE PAST 12 MONTHS, THE FOOD YOU BOUGHT JUST DIDN'T LAST AND YOU DIDN'T HAVE MONEY TO GET MORE.: NEVER TRUE

## 2025-05-15 SDOH — ECONOMIC STABILITY: INCOME INSECURITY: IN THE LAST 12 MONTHS, WAS THERE A TIME WHEN YOU WERE NOT ABLE TO PAY THE MORTGAGE OR RENT ON TIME?: NO

## 2025-05-15 SDOH — ECONOMIC STABILITY: FOOD INSECURITY: WITHIN THE PAST 12 MONTHS, YOU WORRIED THAT YOUR FOOD WOULD RUN OUT BEFORE YOU GOT MONEY TO BUY MORE.: NEVER TRUE

## 2025-05-15 ASSESSMENT — PATIENT HEALTH QUESTIONNAIRE - PHQ9
SUM OF ALL RESPONSES TO PHQ QUESTIONS 1-9: 0
2. FEELING DOWN, DEPRESSED OR HOPELESS: NOT AT ALL
1. LITTLE INTEREST OR PLEASURE IN DOING THINGS: NOT AT ALL
SUM OF ALL RESPONSES TO PHQ QUESTIONS 1-9: 0
2. FEELING DOWN, DEPRESSED OR HOPELESS: NOT AT ALL
SUM OF ALL RESPONSES TO PHQ9 QUESTIONS 1 & 2: 0
1. LITTLE INTEREST OR PLEASURE IN DOING THINGS: NOT AT ALL

## 2025-05-15 NOTE — PROGRESS NOTES
Subjective:  Chief Complaint   Patient presents with    Rectal Pain     HPI:  Steffany Luna is a 65 y.o. female who arrives to office as an established patient.    Steffany reports vaginal discharge that is white. She reports irritation and itching. She also reports urinary frequency. No burning with urination. No urgency.     Patient also reports a lot of abdominal cramping and pain recently which are related to bowel movements. Reports she sometimes has rectal bleeding with bowel movements. States she has a history of polyps per her last colonoscopy. She would like a GI referral today to get another colonoscopy done.     Review of Systems   Constitutional: Negative.    HENT: Negative.     Genitourinary:  Positive for frequency and vaginal discharge.        Vaginal and rectal irritation    All other systems reviewed and are negative.      Objective:  Vitals:    05/15/25 1505   BP: 128/70   Weight: 85.4 kg (188 lb 3.2 oz)   Height: 1.575 m (5' 2\")      Body mass index is 34.42 kg/m².  No LMP recorded. Patient is postmenopausal.  Current Outpatient Medications on File Prior to Visit   Medication Sig Dispense Refill    ibuprofen (ADVIL;MOTRIN) 800 MG tablet TAKE 1 TABLET (800 MG) BY MOUTH THREE TIMES DAILY.      SUMAtriptan (IMITREX) 100 MG tablet TAKE 1 TABLET (100 MG) BY MOUTH IF NEEDED EACH DAY FOR MIGRAINE.      clotrimazole (MYCELEX) 10 MG sina Take 1 tablet by mouth 5 times daily      Ferrous Gluconate-C-Folic Acid (IRON-C PO) Take 1 gum by mouth daily Gummy      NONFORMULARY Take by mouth Daily with supper S[place] Bark      Cranberry-Vitamin C-Probiotic (AZO CRANBERRY PO) Take 2 capsules by mouth Daily with supper       No current facility-administered medications on file prior to visit.      Past Medical History:   Diagnosis Date    Anemia     Fibroid, uterine     Fibromyalgia     Pt denies    History of blood transfusion     History of postmenopausal bleeding     episode in 2021, 2017    Hypothyroidism

## 2025-05-20 ENCOUNTER — TELEPHONE (OUTPATIENT)
Dept: OBGYN CLINIC | Age: 65
End: 2025-05-20

## 2025-05-20 NOTE — TELEPHONE ENCOUNTER
HEIDY    Called and spoke w/ pt per Aundrea samson, relayed negative health track results.   Pt has finished diflucan and is still using aquaphor, she states her sx are almost completely resolved and is doing much better. I explained to pt to call the office if she needs anything else or if sx returned.

## 2025-05-20 NOTE — TELEPHONE ENCOUNTER
SV CNM Incoming Phone Call from Current Patient    This was an OUTGOING phone call per CNM in office request above documentation incorrect    Patient Request: it was NOT a patient request, it was Aundrea Sierradali's request to call the pt while in office today, while she was closing charts       Patient Symptoms: pt states her sx are much better and she is almost completely healed       Last annual visit: 3/7/24    Upcoming annual scheduled 7/17/25      Last provider visit: 5/15/25         Can this concern wait 24-48hrs to be addressed?  N/a this has already been addressed forwarding documentation for CNM to note        If it cannot wait 24 hours- please route to on call CNM     If patient states if can wait- please route message to CNM that last dealt with this complaint.      * please check CNM schedule to make sure the provider you are routing to is working today!

## 2025-05-29 DIAGNOSIS — R35.0 URINARY FREQUENCY: Primary | ICD-10-CM

## 2025-05-29 RX ORDER — NITROFURANTOIN 25; 75 MG/1; MG/1
100 CAPSULE ORAL 2 TIMES DAILY
Qty: 10 CAPSULE | Refills: 0 | Status: SHIPPED | OUTPATIENT
Start: 2025-05-29 | End: 2025-05-29

## 2025-05-29 RX ORDER — CEPHALEXIN 500 MG/1
500 CAPSULE ORAL 2 TIMES DAILY
Qty: 14 CAPSULE | Refills: 0 | Status: SHIPPED | OUTPATIENT
Start: 2025-05-29 | End: 2025-06-05

## 2025-05-29 NOTE — TELEPHONE ENCOUNTER
Pt called to state she has G6PD and is unable to take Macrobid,and needs another abx rx'd  please advise

## 2025-05-29 NOTE — TELEPHONE ENCOUNTER
SANDHYA CNM Incoming Phone Call from Current Patient    Patient Request: patient is calling on results on urine culture sent to [facility]    Lab Results  - documented in this encounter   (ABNORMAL) Urine culture, routine (05/15/2025 8:47 PM EDT)  Lab Results - (ABNORMAL) Urine culture, routine (05/15/2025 8:47 PM EDT)  Component Value Ref Range Test Method Analysis Time Performed At Pathologist Signature   Urine Culture 50,000 - 100,000 CFU/Ml Lactobacillus jensenii (A)   KORINA  05/18/2025 1:10 PM EDT [facility] ([facility])       Lab Results - (ABNORMAL) Urine culture, routine (05/15/2025 8:47 PM EDT)  Specimen (Source) Anatomical Location / Laterality Collection Method / Volume Collection Time Received Time   Urine Urine specimen obtained by clean catch procedure / Unknown   05/15/2025 8:47 PM EDT 05/15/2025 10:16 PM EDT         Patient Symptoms: lower back pain , frequency urination       Last annual visit:       Last provider visit: 5/15/25         Can this concern wait 24-48hrs to be addressed? No      If it cannot wait 24 hours- please route to on call CNM     If patient states if can wait- please route message to CNM that last dealt with this complaint.      * please check CNM schedule to make sure the provider you are routing to is working today!